# Patient Record
Sex: FEMALE | Race: WHITE | NOT HISPANIC OR LATINO | Employment: OTHER | URBAN - METROPOLITAN AREA
[De-identification: names, ages, dates, MRNs, and addresses within clinical notes are randomized per-mention and may not be internally consistent; named-entity substitution may affect disease eponyms.]

---

## 2017-01-03 ENCOUNTER — HOSPITAL ENCOUNTER (OUTPATIENT)
Dept: LAB | Facility: MEDICAL CENTER | Age: 77
End: 2017-01-03
Attending: INTERNAL MEDICINE
Payer: MEDICARE

## 2017-01-03 LAB
ALBUMIN SERPL BCP-MCNC: 3.9 G/DL (ref 3.2–4.9)
ALBUMIN/GLOB SERPL: 1.6 G/DL
ALP SERPL-CCNC: 96 U/L (ref 30–99)
ALT SERPL-CCNC: 15 U/L (ref 2–50)
ANION GAP SERPL CALC-SCNC: 9 MMOL/L (ref 0–11.9)
APTT PPP: 27 SEC (ref 24.7–36)
AST SERPL-CCNC: 26 U/L (ref 12–45)
BASOPHILS # BLD AUTO: 0.02 K/UL (ref 0–0.12)
BASOPHILS NFR BLD AUTO: 0.4 % (ref 0–1.8)
BILIRUB SERPL-MCNC: 0.5 MG/DL (ref 0.1–1.5)
BUN SERPL-MCNC: 19 MG/DL (ref 8–22)
CALCIUM SERPL-MCNC: 9 MG/DL (ref 8.5–10.5)
CANCER AG19-9 SERPL-ACNC: 40.9 U/ML (ref 0–35)
CHLORIDE SERPL-SCNC: 94 MMOL/L (ref 96–112)
CO2 SERPL-SCNC: 27 MMOL/L (ref 20–33)
CREAT SERPL-MCNC: 0.89 MG/DL (ref 0.5–1.4)
EOSINOPHIL # BLD: 0.06 K/UL (ref 0–0.51)
EOSINOPHIL NFR BLD AUTO: 1.2 % (ref 0–6.9)
ERYTHROCYTE [DISTWIDTH] IN BLOOD BY AUTOMATED COUNT: 64.3 FL (ref 35.9–50)
GLOBULIN SER CALC-MCNC: 2.5 G/DL (ref 1.9–3.5)
GLUCOSE SERPL-MCNC: 94 MG/DL (ref 65–99)
HCT VFR BLD AUTO: 40.1 % (ref 37–47)
HGB BLD-MCNC: 13.2 G/DL (ref 12–16)
IMM GRANULOCYTES # BLD AUTO: 0.01 K/UL (ref 0–0.11)
IMM GRANULOCYTES NFR BLD AUTO: 0.2 % (ref 0–0.9)
INR PPP: 0.87 (ref 0.87–1.13)
LYMPHOCYTES # BLD: 0.95 K/UL (ref 1–4.8)
LYMPHOCYTES NFR BLD AUTO: 18.7 % (ref 22–41)
MCH RBC QN AUTO: 28.1 PG (ref 27–33)
MCHC RBC AUTO-ENTMCNC: 32.9 G/DL (ref 33.6–35)
MCV RBC AUTO: 85.5 FL (ref 81.4–97.8)
MONOCYTES # BLD: 0.75 K/UL (ref 0–0.85)
MONOCYTES NFR BLD AUTO: 14.8 % (ref 0–13.4)
NEUTROPHILS # BLD: 3.29 K/UL (ref 2–7.15)
NEUTROPHILS NFR BLD AUTO: 64.7 % (ref 44–72)
NRBC # BLD AUTO: 0 K/UL
NRBC BLD-RTO: 0 /100 WBC
PLATELET # BLD AUTO: 210 K/UL (ref 164–446)
PMV BLD AUTO: 11 FL (ref 9–12.9)
POTASSIUM SERPL-SCNC: 4.7 MMOL/L (ref 3.6–5.5)
PROT SERPL-MCNC: 6.4 G/DL (ref 6–8.2)
PROTHROMBIN TIME: 12.1 SEC (ref 12–14.6)
RBC # BLD AUTO: 4.69 M/UL (ref 4.2–5.4)
SODIUM SERPL-SCNC: 130 MMOL/L (ref 135–145)
WBC # BLD AUTO: 5.1 K/UL (ref 4.8–10.8)

## 2017-01-03 PROCEDURE — 85025 COMPLETE CBC W/AUTO DIFF WBC: CPT

## 2017-01-03 PROCEDURE — 80053 COMPREHEN METABOLIC PANEL: CPT

## 2017-01-03 PROCEDURE — 36415 COLL VENOUS BLD VENIPUNCTURE: CPT

## 2017-01-03 PROCEDURE — 85730 THROMBOPLASTIN TIME PARTIAL: CPT | Mod: GA

## 2017-01-03 PROCEDURE — 86301 IMMUNOASSAY TUMOR CA 19-9: CPT | Mod: GA

## 2017-01-03 PROCEDURE — 85610 PROTHROMBIN TIME: CPT

## 2017-01-03 RX ORDER — METOPROLOL SUCCINATE 25 MG/1
TABLET, EXTENDED RELEASE ORAL
Qty: 90 TAB | Refills: 1 | Status: SHIPPED | OUTPATIENT
Start: 2017-01-03

## 2017-01-03 NOTE — TELEPHONE ENCOUNTER
Was the patient seen in the last year in this department? Yes     Does patient have an active prescription for medications requested? No     Received Request Via: Pharmacy

## 2017-01-04 ENCOUNTER — APPOINTMENT (OUTPATIENT)
Dept: ADMISSIONS | Facility: MEDICAL CENTER | Age: 77
End: 2017-01-04
Attending: INTERNAL MEDICINE
Payer: MEDICARE

## 2017-01-11 PROBLEM — R63.4 LOSS OF WEIGHT: Status: ACTIVE | Noted: 2017-01-11

## 2017-01-11 PROBLEM — R93.5 ABNORMAL ABDOMINAL CT SCAN: Status: ACTIVE | Noted: 2017-01-11

## 2017-01-11 PROBLEM — R93.2: Status: RESOLVED | Noted: 2017-01-11 | Resolved: 2017-01-11

## 2017-01-11 PROBLEM — D50.9 IRON DEFICIENCY ANEMIA, UNSPECIFIED: Status: ACTIVE | Noted: 2017-01-11

## 2017-01-11 PROBLEM — R93.2: Status: ACTIVE | Noted: 2017-01-11

## 2017-01-16 DIAGNOSIS — G47.00 INSOMNIA, UNSPECIFIED TYPE: ICD-10-CM

## 2017-01-16 RX ORDER — ZOLPIDEM TARTRATE 10 MG/1
10 TABLET ORAL NIGHTLY PRN
Qty: 90 TAB | Refills: 0 | Status: SHIPPED
Start: 2017-01-16

## 2017-01-25 DIAGNOSIS — E55.9 VITAMIN D DEFICIENCY: ICD-10-CM

## 2017-01-25 DIAGNOSIS — E78.5 HYPERLIPIDEMIA LDL GOAL <70: ICD-10-CM

## 2017-01-25 DIAGNOSIS — E03.9 ACQUIRED HYPOTHYROIDISM: ICD-10-CM

## 2017-01-25 DIAGNOSIS — I10 ESSENTIAL HYPERTENSION, BENIGN: ICD-10-CM

## 2017-01-25 DIAGNOSIS — D50.9 IRON DEFICIENCY ANEMIA, UNSPECIFIED: ICD-10-CM

## 2017-01-25 RX ORDER — LISINOPRIL 20 MG/1
TABLET ORAL
Qty: 90 TAB | Refills: 0 | Status: SHIPPED | OUTPATIENT
Start: 2017-01-25

## 2017-01-25 NOTE — Clinical Note
January 26, 2017        Shyla Ochoa  [address] [address]        Dear Shyla:    We have received a request from your pharmacy to refill your prescription(s). After careful review of your chart, we have noted you are due for labs and a follow up appointment in April 2017.  We request you call our office at [phone] at your earliest convenience and make an appointment. I have included a fasting lab order for you.    Prescribed medications provided needed treatments for our patients. However, when patients are not followed closely by their physician, potential medication complications may go unadressed. We look forward to scheduling an appointment for you, so that we may provide you with the safest and most complete medical care.        If you have any questions or concerns, please don't hesitate to call.        Sincerely,        CLAUDE Joel.    Electronically Signed

## 2017-04-18 ENCOUNTER — HOSPITAL ENCOUNTER (OUTPATIENT)
Dept: LAB | Facility: MEDICAL CENTER | Age: 77
DRG: 115 | End: 2017-04-18
Attending: NURSE PRACTITIONER
Payer: MEDICARE

## 2017-04-18 DIAGNOSIS — E03.9 ACQUIRED HYPOTHYROIDISM: ICD-10-CM

## 2017-04-18 DIAGNOSIS — E55.9 VITAMIN D DEFICIENCY: ICD-10-CM

## 2017-04-18 DIAGNOSIS — I10 ESSENTIAL HYPERTENSION, BENIGN: ICD-10-CM

## 2017-04-18 DIAGNOSIS — E78.5 HYPERLIPIDEMIA LDL GOAL <70: ICD-10-CM

## 2017-04-18 DIAGNOSIS — D50.9 IRON DEFICIENCY ANEMIA, UNSPECIFIED: ICD-10-CM

## 2017-04-18 LAB
25(OH)D3 SERPL-MCNC: 33 NG/ML (ref 30–100)
ALBUMIN SERPL BCP-MCNC: 4 G/DL (ref 3.2–4.9)
ALBUMIN/GLOB SERPL: 1.5 G/DL
ALP SERPL-CCNC: 98 U/L (ref 30–99)
ALT SERPL-CCNC: 16 U/L (ref 2–50)
ANION GAP SERPL CALC-SCNC: 14 MMOL/L (ref 0–11.9)
AST SERPL-CCNC: 29 U/L (ref 12–45)
BASOPHILS # BLD AUTO: 0.1 % (ref 0–1.8)
BASOPHILS # BLD: 0.01 K/UL (ref 0–0.12)
BILIRUB SERPL-MCNC: 1.2 MG/DL (ref 0.1–1.5)
BUN SERPL-MCNC: 18 MG/DL (ref 8–22)
CALCIUM SERPL-MCNC: 9.2 MG/DL (ref 8.5–10.5)
CHLORIDE SERPL-SCNC: 100 MMOL/L (ref 96–112)
CHOLEST SERPL-MCNC: 237 MG/DL (ref 100–199)
CO2 SERPL-SCNC: 22 MMOL/L (ref 20–33)
CREAT SERPL-MCNC: 0.93 MG/DL (ref 0.5–1.4)
EOSINOPHIL # BLD AUTO: 0.02 K/UL (ref 0–0.51)
EOSINOPHIL NFR BLD: 0.2 % (ref 0–6.9)
ERYTHROCYTE [DISTWIDTH] IN BLOOD BY AUTOMATED COUNT: 63.2 FL (ref 35.9–50)
GFR SERPL CREATININE-BSD FRML MDRD: 58 ML/MIN/1.73 M 2
GLOBULIN SER CALC-MCNC: 2.7 G/DL (ref 1.9–3.5)
GLUCOSE SERPL-MCNC: 119 MG/DL (ref 65–99)
HCT VFR BLD AUTO: 38.9 % (ref 37–47)
HDLC SERPL-MCNC: 160 MG/DL
HGB BLD-MCNC: 13.3 G/DL (ref 12–16)
IMM GRANULOCYTES # BLD AUTO: 0.03 K/UL (ref 0–0.11)
IMM GRANULOCYTES NFR BLD AUTO: 0.3 % (ref 0–0.9)
LDLC SERPL CALC-MCNC: 49 MG/DL
LYMPHOCYTES # BLD AUTO: 0.83 K/UL (ref 1–4.8)
LYMPHOCYTES NFR BLD: 9.1 % (ref 22–41)
MCH RBC QN AUTO: 33.8 PG (ref 27–33)
MCHC RBC AUTO-ENTMCNC: 34.2 G/DL (ref 33.6–35)
MCV RBC AUTO: 99 FL (ref 81.4–97.8)
MONOCYTES # BLD AUTO: 1.09 K/UL (ref 0–0.85)
MONOCYTES NFR BLD AUTO: 12 % (ref 0–13.4)
NEUTROPHILS # BLD AUTO: 7.14 K/UL (ref 2–7.15)
NEUTROPHILS NFR BLD: 78.3 % (ref 44–72)
NRBC # BLD AUTO: 0.02 K/UL
NRBC BLD AUTO-RTO: 0.2 /100 WBC
PLATELET # BLD AUTO: 178 K/UL (ref 164–446)
PMV BLD AUTO: 11 FL (ref 9–12.9)
POTASSIUM SERPL-SCNC: 3.5 MMOL/L (ref 3.6–5.5)
PROT SERPL-MCNC: 6.7 G/DL (ref 6–8.2)
RBC # BLD AUTO: 3.93 M/UL (ref 4.2–5.4)
SODIUM SERPL-SCNC: 136 MMOL/L (ref 135–145)
T4 FREE SERPL-MCNC: 0.88 NG/DL (ref 0.53–1.43)
TRIGL SERPL-MCNC: 138 MG/DL (ref 0–149)
TSH SERPL DL<=0.005 MIU/L-ACNC: 2.49 UIU/ML (ref 0.3–3.7)
WBC # BLD AUTO: 9.1 K/UL (ref 4.8–10.8)

## 2017-04-20 ENCOUNTER — HOSPITAL ENCOUNTER (INPATIENT)
Facility: MEDICAL CENTER | Age: 77
LOS: 4 days | DRG: 115 | End: 2017-04-24
Attending: EMERGENCY MEDICINE | Admitting: SURGERY
Payer: MEDICARE

## 2017-04-20 ENCOUNTER — APPOINTMENT (OUTPATIENT)
Dept: RADIOLOGY | Facility: MEDICAL CENTER | Age: 77
DRG: 115 | End: 2017-04-20
Attending: EMERGENCY MEDICINE
Payer: MEDICARE

## 2017-04-20 DIAGNOSIS — Z79.01 INADEQUATE ANTICOAGULATION: ICD-10-CM

## 2017-04-20 DIAGNOSIS — S02.92XA: ICD-10-CM

## 2017-04-20 DIAGNOSIS — Z51.81 INADEQUATE ANTICOAGULATION: ICD-10-CM

## 2017-04-20 DIAGNOSIS — S01.81XA FOREHEAD LACERATION, INITIAL ENCOUNTER: ICD-10-CM

## 2017-04-20 DIAGNOSIS — S05.31XA: ICD-10-CM

## 2017-04-20 DIAGNOSIS — S02.30XA CLOSED FRACTURE OF ORBITAL FLOOR, INITIAL ENCOUNTER (HCC): ICD-10-CM

## 2017-04-20 DIAGNOSIS — S05.31XA RUPTURED GLOBE OF RIGHT EYE, INITIAL ENCOUNTER: ICD-10-CM

## 2017-04-20 DIAGNOSIS — F10.929 ALCOHOL INTOXICATION, WITH UNSPECIFIED COMPLICATION (HCC): ICD-10-CM

## 2017-04-20 DIAGNOSIS — T14.90XA TRAUMA: ICD-10-CM

## 2017-04-20 LAB
ALBUMIN SERPL BCP-MCNC: 4.2 G/DL (ref 3.2–4.9)
ALBUMIN/GLOB SERPL: 1.4 G/DL
ALP SERPL-CCNC: 99 U/L (ref 30–99)
ALT SERPL-CCNC: 18 U/L (ref 2–50)
ANION GAP SERPL CALC-SCNC: 11 MMOL/L (ref 0–11.9)
APTT PPP: 22.8 SEC (ref 24.7–36)
AST SERPL-CCNC: 35 U/L (ref 12–45)
BILIRUB SERPL-MCNC: 0.4 MG/DL (ref 0.1–1.5)
BUN SERPL-MCNC: 19 MG/DL (ref 8–22)
CALCIUM SERPL-MCNC: 8.9 MG/DL (ref 8.5–10.5)
CFT BLD TEG: 4.4 MIN (ref 5–10)
CHLORIDE SERPL-SCNC: 106 MMOL/L (ref 96–112)
CLOT ANGLE BLD TEG: 68.9 DEGREES (ref 53–72)
CLOT LYSIS 30M P MA LENFR BLD TEG: 1.6 % (ref 0–8)
CO2 SERPL-SCNC: 26 MMOL/L (ref 20–33)
CREAT SERPL-MCNC: 0.86 MG/DL (ref 0.5–1.4)
CT.EXTRINSIC BLD ROTEM: 1.6 MIN (ref 1–3)
ERYTHROCYTE [DISTWIDTH] IN BLOOD BY AUTOMATED COUNT: 68.5 FL (ref 35.9–50)
ETHANOL BLD-MCNC: 0.34 G/DL
GFR SERPL CREATININE-BSD FRML MDRD: >60 ML/MIN/1.73 M 2
GLOBULIN SER CALC-MCNC: 2.9 G/DL (ref 1.9–3.5)
GLUCOSE SERPL-MCNC: 117 MG/DL (ref 65–99)
HCG SERPL QL: NEGATIVE
HCT VFR BLD AUTO: 41.8 % (ref 37–47)
HGB BLD-MCNC: 13.5 G/DL (ref 12–16)
INR PPP: 0.94 (ref 0.87–1.13)
MCF BLD TEG: 54.8 MM (ref 50–70)
MCH RBC QN AUTO: 32.3 PG (ref 27–33)
MCHC RBC AUTO-ENTMCNC: 32.3 G/DL (ref 33.6–35)
MCV RBC AUTO: 100 FL (ref 81.4–97.8)
PLATELET # BLD AUTO: 187 K/UL (ref 164–446)
PMV BLD AUTO: 10.1 FL (ref 9–12.9)
POTASSIUM SERPL-SCNC: 3.7 MMOL/L (ref 3.6–5.5)
PROT SERPL-MCNC: 7.1 G/DL (ref 6–8.2)
PROTHROMBIN TIME: 12.9 SEC (ref 12–14.6)
RBC # BLD AUTO: 4.18 M/UL (ref 4.2–5.4)
SODIUM SERPL-SCNC: 143 MMOL/L (ref 135–145)
TEG ALGORITHM TGALG: ABNORMAL
WBC # BLD AUTO: 7.3 K/UL (ref 4.8–10.8)

## 2017-04-20 PROCEDURE — 70450 CT HEAD/BRAIN W/O DYE: CPT

## 2017-04-20 PROCEDURE — 770006 HCHG ROOM/CARE - MED/SURG/GYN SEMI*

## 2017-04-20 PROCEDURE — 160009 HCHG ANES TIME/MIN: Performed by: OPHTHALMOLOGY

## 2017-04-20 PROCEDURE — 96375 TX/PRO/DX INJ NEW DRUG ADDON: CPT

## 2017-04-20 PROCEDURE — 304562 HCHG STAT O2 MASK/CANNULA

## 2017-04-20 PROCEDURE — 96365 THER/PROPH/DIAG IV INF INIT: CPT

## 2017-04-20 PROCEDURE — 85576 BLOOD PLATELET AGGREGATION: CPT

## 2017-04-20 PROCEDURE — 700111 HCHG RX REV CODE 636 W/ 250 OVERRIDE (IP): Performed by: OPHTHALMOLOGY

## 2017-04-20 PROCEDURE — 08QSXZZ REPAIR RIGHT CONJUNCTIVA, EXTERNAL APPROACH: ICD-10-PCS | Performed by: OPHTHALMOLOGY

## 2017-04-20 PROCEDURE — 700102 HCHG RX REV CODE 250 W/ 637 OVERRIDE(OP): Performed by: NURSE PRACTITIONER

## 2017-04-20 PROCEDURE — A6402 STERILE GAUZE <= 16 SQ IN: HCPCS | Performed by: OPHTHALMOLOGY

## 2017-04-20 PROCEDURE — 96367 TX/PROPH/DG ADDL SEQ IV INF: CPT

## 2017-04-20 PROCEDURE — 700111 HCHG RX REV CODE 636 W/ 250 OVERRIDE (IP)

## 2017-04-20 PROCEDURE — 72125 CT NECK SPINE W/O DYE: CPT

## 2017-04-20 PROCEDURE — 160048 HCHG OR STATISTICAL LEVEL 1-5: Performed by: OPHTHALMOLOGY

## 2017-04-20 PROCEDURE — 160041 HCHG SURGERY MINUTES - EA ADDL 1 MIN LEVEL 4: Performed by: OPHTHALMOLOGY

## 2017-04-20 PROCEDURE — 90471 IMMUNIZATION ADMIN: CPT

## 2017-04-20 PROCEDURE — 85027 COMPLETE CBC AUTOMATED: CPT

## 2017-04-20 PROCEDURE — 85730 THROMBOPLASTIN TIME PARTIAL: CPT

## 2017-04-20 PROCEDURE — 700101 HCHG RX REV CODE 250: Performed by: EMERGENCY MEDICINE

## 2017-04-20 PROCEDURE — 110382 HCHG SHELL REV 271: Performed by: OPHTHALMOLOGY

## 2017-04-20 PROCEDURE — 160029 HCHG SURGERY MINUTES - 1ST 30 MINS LEVEL 4: Performed by: OPHTHALMOLOGY

## 2017-04-20 PROCEDURE — 70486 CT MAXILLOFACIAL W/O DYE: CPT

## 2017-04-20 PROCEDURE — 160036 HCHG PACU - EA ADDL 30 MINS PHASE I: Performed by: OPHTHALMOLOGY

## 2017-04-20 PROCEDURE — 700111 HCHG RX REV CODE 636 W/ 250 OVERRIDE (IP): Performed by: EMERGENCY MEDICINE

## 2017-04-20 PROCEDURE — 700101 HCHG RX REV CODE 250

## 2017-04-20 PROCEDURE — 700101 HCHG RX REV CODE 250: Performed by: OPHTHALMOLOGY

## 2017-04-20 PROCEDURE — 502240 HCHG MISC OR SUPPLY RC 0272: Performed by: OPHTHALMOLOGY

## 2017-04-20 PROCEDURE — 0HQ1XZZ REPAIR FACE SKIN, EXTERNAL APPROACH: ICD-10-PCS | Performed by: EMERGENCY MEDICINE

## 2017-04-20 PROCEDURE — 08Q6XZZ REPAIR RIGHT SCLERA, EXTERNAL APPROACH: ICD-10-PCS | Performed by: OPHTHALMOLOGY

## 2017-04-20 PROCEDURE — 160002 HCHG RECOVERY MINUTES (STAT): Performed by: OPHTHALMOLOGY

## 2017-04-20 PROCEDURE — 700102 HCHG RX REV CODE 250 W/ 637 OVERRIDE(OP): Performed by: SURGERY

## 2017-04-20 PROCEDURE — 160035 HCHG PACU - 1ST 60 MINS PHASE I: Performed by: OPHTHALMOLOGY

## 2017-04-20 PROCEDURE — 303747 HCHG EXTRA SUTURE

## 2017-04-20 PROCEDURE — A9270 NON-COVERED ITEM OR SERVICE: HCPCS | Performed by: SURGERY

## 2017-04-20 PROCEDURE — 304999 HCHG REPAIR-SIMPLE/INTERMED LEVEL 1

## 2017-04-20 PROCEDURE — 502572 HCHG PACK, GENERAL EYE: Performed by: OPHTHALMOLOGY

## 2017-04-20 PROCEDURE — 500122 HCHG BOVIE, BLADE: Performed by: OPHTHALMOLOGY

## 2017-04-20 PROCEDURE — 90715 TDAP VACCINE 7 YRS/> IM: CPT | Performed by: EMERGENCY MEDICINE

## 2017-04-20 PROCEDURE — 99285 EMERGENCY DEPT VISIT HI MDM: CPT

## 2017-04-20 PROCEDURE — 85347 COAGULATION TIME ACTIVATED: CPT

## 2017-04-20 PROCEDURE — 700111 HCHG RX REV CODE 636 W/ 250 OVERRIDE (IP): Performed by: NURSE PRACTITIONER

## 2017-04-20 PROCEDURE — 80307 DRUG TEST PRSMV CHEM ANLYZR: CPT

## 2017-04-20 PROCEDURE — 85610 PROTHROMBIN TIME: CPT

## 2017-04-20 PROCEDURE — 501836 HCHG SUTURE EYE: Performed by: OPHTHALMOLOGY

## 2017-04-20 PROCEDURE — 304217 HCHG IRRIGATION SYSTEM

## 2017-04-20 PROCEDURE — 71010 DX-CHEST-PORTABLE (1 VIEW): CPT

## 2017-04-20 PROCEDURE — HZ2ZZZZ DETOXIFICATION SERVICES FOR SUBSTANCE ABUSE TREATMENT: ICD-10-PCS | Performed by: SURGERY

## 2017-04-20 PROCEDURE — 110371 HCHG SHELL REV 272: Performed by: OPHTHALMOLOGY

## 2017-04-20 PROCEDURE — A9270 NON-COVERED ITEM OR SERVICE: HCPCS | Performed by: NURSE PRACTITIONER

## 2017-04-20 PROCEDURE — A6410 STERILE EYE PAD: HCPCS | Performed by: OPHTHALMOLOGY

## 2017-04-20 PROCEDURE — A4606 OXYGEN PROBE USED W OXIMETER: HCPCS | Performed by: OPHTHALMOLOGY

## 2017-04-20 PROCEDURE — 84703 CHORIONIC GONADOTROPIN ASSAY: CPT

## 2017-04-20 PROCEDURE — 85384 FIBRINOGEN ACTIVITY: CPT

## 2017-04-20 PROCEDURE — 3E0234Z INTRODUCTION OF SERUM, TOXOID AND VACCINE INTO MUSCLE, PERCUTANEOUS APPROACH: ICD-10-PCS | Performed by: EMERGENCY MEDICINE

## 2017-04-20 PROCEDURE — 500558 HCHG EYE SHIELD W/GARTER (FOX): Performed by: OPHTHALMOLOGY

## 2017-04-20 PROCEDURE — 96376 TX/PRO/DX INJ SAME DRUG ADON: CPT

## 2017-04-20 PROCEDURE — 80053 COMPREHEN METABOLIC PANEL: CPT

## 2017-04-20 PROCEDURE — 700111 HCHG RX REV CODE 636 W/ 250 OVERRIDE (IP): Performed by: SURGERY

## 2017-04-20 RX ORDER — METOPROLOL SUCCINATE 50 MG/1
50 TABLET, EXTENDED RELEASE ORAL DAILY
Status: ON HOLD | COMMUNITY
End: 2017-04-24

## 2017-04-20 RX ORDER — MOXIFLOXACIN 5 MG/ML
SOLUTION/ DROPS OPHTHALMIC
Status: DISCONTINUED | OUTPATIENT
Start: 2017-04-20 | End: 2017-04-20 | Stop reason: HOSPADM

## 2017-04-20 RX ORDER — MORPHINE SULFATE 4 MG/ML
1-4 INJECTION, SOLUTION INTRAMUSCULAR; INTRAVENOUS
Status: DISCONTINUED | OUTPATIENT
Start: 2017-04-20 | End: 2017-04-24 | Stop reason: HOSPADM

## 2017-04-20 RX ORDER — BISACODYL 10 MG
10 SUPPOSITORY, RECTAL RECTAL
Status: DISCONTINUED | OUTPATIENT
Start: 2017-04-20 | End: 2017-04-24 | Stop reason: HOSPADM

## 2017-04-20 RX ORDER — MORPHINE SULFATE 4 MG/ML
1-5 INJECTION, SOLUTION INTRAMUSCULAR; INTRAVENOUS
Status: DISCONTINUED | OUTPATIENT
Start: 2017-04-20 | End: 2017-04-20

## 2017-04-20 RX ORDER — OXYCODONE HYDROCHLORIDE 10 MG/1
10 TABLET ORAL
Status: DISCONTINUED | OUTPATIENT
Start: 2017-04-20 | End: 2017-04-24 | Stop reason: HOSPADM

## 2017-04-20 RX ORDER — LEVOTHYROXINE SODIUM 0.05 MG/1
50 TABLET ORAL
Status: DISCONTINUED | OUTPATIENT
Start: 2017-04-21 | End: 2017-04-24 | Stop reason: HOSPADM

## 2017-04-20 RX ORDER — CEFAZOLIN SODIUM 1 G/3ML
1 INJECTION, POWDER, FOR SOLUTION INTRAMUSCULAR; INTRAVENOUS ONCE
Status: COMPLETED | OUTPATIENT
Start: 2017-04-20 | End: 2017-04-20

## 2017-04-20 RX ORDER — GABAPENTIN 300 MG/1
300 CAPSULE ORAL 3 TIMES DAILY
Status: ON HOLD | COMMUNITY
End: 2017-04-24

## 2017-04-20 RX ORDER — MOXIFLOXACIN HYDROCHLORIDE 400 MG/250ML
400 INJECTION, SOLUTION INTRAVENOUS ONCE
Status: DISCONTINUED | OUTPATIENT
Start: 2017-04-20 | End: 2017-04-20

## 2017-04-20 RX ORDER — LORAZEPAM 1 MG/1
0.5 TABLET ORAL EVERY 4 HOURS PRN
Status: DISCONTINUED | OUTPATIENT
Start: 2017-04-20 | End: 2017-04-24 | Stop reason: HOSPADM

## 2017-04-20 RX ORDER — LORAZEPAM 1 MG/1
1 TABLET ORAL EVERY 4 HOURS PRN
Status: DISCONTINUED | OUTPATIENT
Start: 2017-04-20 | End: 2017-04-24 | Stop reason: HOSPADM

## 2017-04-20 RX ORDER — DEXAMETHASONE SODIUM PHOSPHATE 4 MG/ML
INJECTION, SOLUTION INTRA-ARTICULAR; INTRALESIONAL; INTRAMUSCULAR; INTRAVENOUS; SOFT TISSUE
Status: DISCONTINUED | OUTPATIENT
Start: 2017-04-20 | End: 2017-04-20 | Stop reason: HOSPADM

## 2017-04-20 RX ORDER — LEVOTHYROXINE SODIUM 0.05 MG/1
50 TABLET ORAL
Status: ON HOLD | COMMUNITY
End: 2017-04-24

## 2017-04-20 RX ORDER — CEFAZOLIN SODIUM 1 G/3ML
INJECTION, POWDER, FOR SOLUTION INTRAMUSCULAR; INTRAVENOUS
Status: DISCONTINUED | OUTPATIENT
Start: 2017-04-20 | End: 2017-04-20 | Stop reason: HOSPADM

## 2017-04-20 RX ORDER — ENEMA 19; 7 G/133ML; G/133ML
1 ENEMA RECTAL
Status: DISCONTINUED | OUTPATIENT
Start: 2017-04-20 | End: 2017-04-24 | Stop reason: HOSPADM

## 2017-04-20 RX ORDER — LEVOFLOXACIN 5 MG/ML
750 INJECTION, SOLUTION INTRAVENOUS EVERY 24 HOURS
Status: DISCONTINUED | OUTPATIENT
Start: 2017-04-20 | End: 2017-04-21

## 2017-04-20 RX ORDER — SODIUM CHLORIDE, SODIUM LACTATE, POTASSIUM CHLORIDE, CALCIUM CHLORIDE 600; 310; 30; 20 MG/100ML; MG/100ML; MG/100ML; MG/100ML
INJECTION, SOLUTION INTRAVENOUS CONTINUOUS
Status: DISCONTINUED | OUTPATIENT
Start: 2017-04-20 | End: 2017-04-23

## 2017-04-20 RX ORDER — BALANCED SALT SOLUTION 6.4; .75; .48; .3; 3.9; 1.7 MG/ML; MG/ML; MG/ML; MG/ML; MG/ML; MG/ML
SOLUTION OPHTHALMIC
Status: DISCONTINUED | OUTPATIENT
Start: 2017-04-20 | End: 2017-04-20 | Stop reason: HOSPADM

## 2017-04-20 RX ORDER — OXYCODONE HYDROCHLORIDE 5 MG/1
5 TABLET ORAL
Status: DISCONTINUED | OUTPATIENT
Start: 2017-04-20 | End: 2017-04-24 | Stop reason: HOSPADM

## 2017-04-20 RX ORDER — NEOMYCIN SULFATE, POLYMYXIN B SULFATE AND BACITRACIN ZINC 3.5; 10000; 4 MG/G; [USP'U]/G; [USP'U]/G
OINTMENT OPHTHALMIC
Status: DISCONTINUED | OUTPATIENT
Start: 2017-04-20 | End: 2017-04-20 | Stop reason: HOSPADM

## 2017-04-20 RX ORDER — ONDANSETRON 2 MG/ML
INJECTION INTRAMUSCULAR; INTRAVENOUS
Status: COMPLETED
Start: 2017-04-20 | End: 2017-04-20

## 2017-04-20 RX ORDER — DOCUSATE SODIUM 100 MG/1
100 CAPSULE, LIQUID FILLED ORAL 2 TIMES DAILY
Status: DISCONTINUED | OUTPATIENT
Start: 2017-04-20 | End: 2017-04-24 | Stop reason: HOSPADM

## 2017-04-20 RX ORDER — LORAZEPAM 2 MG/ML
0.5 INJECTION INTRAMUSCULAR EVERY 4 HOURS PRN
Status: DISCONTINUED | OUTPATIENT
Start: 2017-04-20 | End: 2017-04-24 | Stop reason: HOSPADM

## 2017-04-20 RX ORDER — CHLORHEXIDINE GLUCONATE ORAL RINSE 1.2 MG/ML
15 SOLUTION DENTAL EVERY 12 HOURS
Status: DISCONTINUED | OUTPATIENT
Start: 2017-04-20 | End: 2017-04-20

## 2017-04-20 RX ORDER — LISINOPRIL 20 MG/1
20 TABLET ORAL DAILY
Status: DISCONTINUED | OUTPATIENT
Start: 2017-04-21 | End: 2017-04-24 | Stop reason: HOSPADM

## 2017-04-20 RX ORDER — AMOXICILLIN 250 MG
1 CAPSULE ORAL
Status: DISCONTINUED | OUTPATIENT
Start: 2017-04-20 | End: 2017-04-24 | Stop reason: HOSPADM

## 2017-04-20 RX ORDER — METOPROLOL SUCCINATE 50 MG/1
50 TABLET, EXTENDED RELEASE ORAL DAILY
Status: DISCONTINUED | OUTPATIENT
Start: 2017-04-20 | End: 2017-04-24 | Stop reason: HOSPADM

## 2017-04-20 RX ORDER — TROPICAMIDE 5 MG/ML
1 SOLUTION/ DROPS OPHTHALMIC
Status: DISCONTINUED | OUTPATIENT
Start: 2017-04-20 | End: 2017-04-24 | Stop reason: HOSPADM

## 2017-04-20 RX ORDER — LISINOPRIL 20 MG/1
20 TABLET ORAL DAILY
Status: ON HOLD | COMMUNITY
End: 2017-04-24

## 2017-04-20 RX ORDER — ZOLPIDEM TARTRATE 10 MG/1
10 TABLET ORAL NIGHTLY PRN
COMMUNITY
End: 2017-04-20

## 2017-04-20 RX ORDER — GABAPENTIN 300 MG/1
300 CAPSULE ORAL 3 TIMES DAILY
Status: DISCONTINUED | OUTPATIENT
Start: 2017-04-20 | End: 2017-04-24 | Stop reason: HOSPADM

## 2017-04-20 RX ORDER — ONDANSETRON 2 MG/ML
4 INJECTION INTRAMUSCULAR; INTRAVENOUS EVERY 4 HOURS PRN
Status: DISCONTINUED | OUTPATIENT
Start: 2017-04-20 | End: 2017-04-24 | Stop reason: HOSPADM

## 2017-04-20 RX ORDER — MORPHINE SULFATE 10 MG/ML
5 INJECTION, SOLUTION INTRAMUSCULAR; INTRAVENOUS
Status: DISCONTINUED | OUTPATIENT
Start: 2017-04-20 | End: 2017-04-24 | Stop reason: HOSPADM

## 2017-04-20 RX ORDER — LORAZEPAM 2 MG/ML
1 INJECTION INTRAMUSCULAR
Status: DISCONTINUED | OUTPATIENT
Start: 2017-04-20 | End: 2017-04-24 | Stop reason: HOSPADM

## 2017-04-20 RX ORDER — LORAZEPAM 1 MG/1
2 TABLET ORAL
Status: DISCONTINUED | OUTPATIENT
Start: 2017-04-20 | End: 2017-04-24 | Stop reason: HOSPADM

## 2017-04-20 RX ORDER — POLYETHYLENE GLYCOL 3350 17 G/17G
1 POWDER, FOR SOLUTION ORAL 2 TIMES DAILY
Status: DISCONTINUED | OUTPATIENT
Start: 2017-04-20 | End: 2017-04-24 | Stop reason: HOSPADM

## 2017-04-20 RX ORDER — ERYTHROMYCIN 5 MG/G
OINTMENT OPHTHALMIC ONCE
Status: COMPLETED | OUTPATIENT
Start: 2017-04-20 | End: 2017-04-20

## 2017-04-20 RX ORDER — LORAZEPAM 2 MG/ML
1.5 INJECTION INTRAMUSCULAR
Status: DISCONTINUED | OUTPATIENT
Start: 2017-04-20 | End: 2017-04-24 | Stop reason: HOSPADM

## 2017-04-20 RX ORDER — TETRACAINE HYDROCHLORIDE 5 MG/ML
1 SOLUTION OPHTHALMIC ONCE
Status: COMPLETED | OUTPATIENT
Start: 2017-04-20 | End: 2017-04-20

## 2017-04-20 RX ORDER — AMOXICILLIN 250 MG
1 CAPSULE ORAL NIGHTLY
Status: DISCONTINUED | OUTPATIENT
Start: 2017-04-20 | End: 2017-04-24 | Stop reason: HOSPADM

## 2017-04-20 RX ORDER — LORAZEPAM 1 MG/1
3 TABLET ORAL
Status: DISCONTINUED | OUTPATIENT
Start: 2017-04-20 | End: 2017-04-24 | Stop reason: HOSPADM

## 2017-04-20 RX ORDER — LORAZEPAM 1 MG/1
4 TABLET ORAL
Status: DISCONTINUED | OUTPATIENT
Start: 2017-04-20 | End: 2017-04-24 | Stop reason: HOSPADM

## 2017-04-20 RX ORDER — LORAZEPAM 2 MG/ML
2 INJECTION INTRAMUSCULAR
Status: DISCONTINUED | OUTPATIENT
Start: 2017-04-20 | End: 2017-04-24 | Stop reason: HOSPADM

## 2017-04-20 RX ORDER — LIDOCAINE HYDROCHLORIDE AND EPINEPHRINE BITARTRATE 20; .01 MG/ML; MG/ML
10 INJECTION, SOLUTION SUBCUTANEOUS ONCE
Status: COMPLETED | OUTPATIENT
Start: 2017-04-20 | End: 2017-04-20

## 2017-04-20 RX ORDER — OMEPRAZOLE 20 MG/1
20 CAPSULE, DELAYED RELEASE ORAL DAILY
COMMUNITY

## 2017-04-20 RX ADMIN — CEFAZOLIN SODIUM 1 G: 1 INJECTION, SOLUTION INTRAVENOUS at 23:35

## 2017-04-20 RX ADMIN — LEVOFLOXACIN 750 MG: 750 INJECTION, SOLUTION INTRAVENOUS at 12:19

## 2017-04-20 RX ADMIN — GABAPENTIN 300 MG: 300 CAPSULE ORAL at 14:16

## 2017-04-20 RX ADMIN — CEFAZOLIN 1 G: 1 INJECTION, POWDER, FOR SOLUTION INTRAVENOUS at 10:22

## 2017-04-20 RX ADMIN — LIDOCAINE HYDROCHLORIDE AND EPINEPHRINE 10 ML: 20; 10 INJECTION, SOLUTION INFILTRATION; PERINEURAL at 10:44

## 2017-04-20 RX ADMIN — ERYTHROMYCIN: 5 OINTMENT OPHTHALMIC at 08:55

## 2017-04-20 RX ADMIN — ONDANSETRON 4 MG: 2 INJECTION, SOLUTION INTRAMUSCULAR; INTRAVENOUS at 15:47

## 2017-04-20 RX ADMIN — METOPROLOL SUCCINATE 50 MG: 50 TABLET, EXTENDED RELEASE ORAL at 14:16

## 2017-04-20 RX ADMIN — HYDROMORPHONE HYDROCHLORIDE 0.5 MG: 1 INJECTION, SOLUTION INTRAMUSCULAR; INTRAVENOUS; SUBCUTANEOUS at 07:42

## 2017-04-20 RX ADMIN — SODIUM CHLORIDE, POTASSIUM CHLORIDE, SODIUM LACTATE AND CALCIUM CHLORIDE: 600; 310; 30; 20 INJECTION, SOLUTION INTRAVENOUS at 14:30

## 2017-04-20 RX ADMIN — GABAPENTIN 300 MG: 300 CAPSULE ORAL at 23:35

## 2017-04-20 RX ADMIN — LORAZEPAM 0.5 MG: 1 TABLET ORAL at 23:36

## 2017-04-20 RX ADMIN — MORPHINE SULFATE 2 MG: 4 INJECTION INTRAVENOUS at 12:16

## 2017-04-20 RX ADMIN — MORPHINE SULFATE 4 MG: 4 INJECTION INTRAVENOUS at 10:09

## 2017-04-20 RX ADMIN — CLOSTRIDIUM TETANI TOXOID ANTIGEN (FORMALDEHYDE INACTIVATED), CORYNEBACTERIUM DIPHTHERIAE TOXOID ANTIGEN (FORMALDEHYDE INACTIVATED), BORDETELLA PERTUSSIS TOXOID ANTIGEN (GLUTARALDEHYDE INACTIVATED), BORDETELLA PERTUSSIS FILAMENTOUS HEMAGGLUTININ ANTIGEN (FORMALDEHYDE INACTIVATED), BORDETELLA PERTUSSIS PERTACTIN ANTIGEN, AND BORDETELLA PERTUSSIS FIMBRIAE 2/3 ANTIGEN 0.5 ML: 5; 2; 2.5; 5; 3; 5 INJECTION, SUSPENSION INTRAMUSCULAR at 08:30

## 2017-04-20 RX ADMIN — CEFAZOLIN SODIUM 1 G: 1 INJECTION, SOLUTION INTRAVENOUS at 16:50

## 2017-04-20 RX ADMIN — MORPHINE SULFATE 4 MG: 4 INJECTION INTRAVENOUS at 14:19

## 2017-04-20 RX ADMIN — ONDANSETRON 4 MG: 2 INJECTION, SOLUTION INTRAMUSCULAR; INTRAVENOUS at 07:42

## 2017-04-20 ASSESSMENT — LIFESTYLE VARIABLES
NAUSEA AND VOMITING: MILD NAUSEA WITH NO VOMITING
EVER HAD A DRINK FIRST THING IN THE MORNING TO STEADY YOUR NERVES TO GET RID OF A HANGOVER: NO
CONSUMPTION TOTAL: POSITIVE
HAVE PEOPLE ANNOYED YOU BY CRITICIZING YOUR DRINKING: NO
ORIENTATION AND CLOUDING OF SENSORIUM: ORIENTED AND CAN DO SERIAL ADDITIONS
AGITATION: NORMAL ACTIVITY
EVER_SMOKED: NEVER
PAROXYSMAL SWEATS: NO SWEAT VISIBLE
ALCOHOL_USE: YES
AUDITORY DISTURBANCES: NOT PRESENT
EVER FELT BAD OR GUILTY ABOUT YOUR DRINKING: NO
TOTAL SCORE: 0
ON A TYPICAL DAY WHEN YOU DRINK ALCOHOL HOW MANY DRINKS DO YOU HAVE: 2
TREMOR: TREMOR NOT VISIBLE BUT CAN BE FELT, FINGERTIP TO FINGERTIP
VISUAL DISTURBANCES: NOT PRESENT
HOW MANY TIMES IN THE PAST YEAR HAVE YOU HAD 5 OR MORE DRINKS IN A DAY: 0
TOTAL SCORE: 0
AVERAGE NUMBER OF DAYS PER WEEK YOU HAVE A DRINK CONTAINING ALCOHOL: 7
ANXIETY: *
HEADACHE, FULLNESS IN HEAD: MILD
TOTAL SCORE: 0
HAVE YOU EVER FELT YOU SHOULD CUT DOWN ON YOUR DRINKING: NO
TOTAL SCORE: 6

## 2017-04-20 ASSESSMENT — PAIN SCALES - GENERAL
PAINLEVEL_OUTOF10: 8
PAINLEVEL_OUTOF10: 0
PAINLEVEL_OUTOF10: 1
PAINLEVEL_OUTOF10: 0
PAINLEVEL_OUTOF10: 0
PAINLEVEL_OUTOF10: 7
PAINLEVEL_OUTOF10: 4
PAINLEVEL_OUTOF10: 0

## 2017-04-20 ASSESSMENT — ENCOUNTER SYMPTOMS
EYE PAIN: 1
ABDOMINAL PAIN: 0
MYALGIAS: 1
HEADACHES: 0
CHILLS: 0
NAUSEA: 0
VOMITING: 0
FEVER: 0
FOCAL WEAKNESS: 0
SHORTNESS OF BREATH: 0

## 2017-04-20 ASSESSMENT — COPD QUESTIONNAIRES
DURING THE PAST 4 WEEKS HOW MUCH DID YOU FEEL SHORT OF BREATH: NONE/LITTLE OF THE TIME
HAVE YOU SMOKED AT LEAST 100 CIGARETTES IN YOUR ENTIRE LIFE: NO/DON'T KNOW
COPD SCREENING SCORE: 2
DO YOU EVER COUGH UP ANY MUCUS OR PHLEGM?: NO/ONLY WITH OCCASIONAL COLDS OR INFECTIONS

## 2017-04-20 NOTE — ED NOTES
Pt BIB [facility], pt presents with traumatic injury to rt eye , per [facility] - LOC, pt presents alert/oriented , after rt eye exposure , saline soaked guaze placed to rt eye

## 2017-04-20 NOTE — PROGRESS NOTES
"  Trauma/Surgical Progress Note    Author: Marcos Parsons Date & Time created: 4/20/2017   2:57 PM     Interval Events:    Admitted, fall.   Tertiary exam completed   OR tonight for right eye injury  Counseled     Review of Systems   Constitutional: Negative for fever and chills.   HENT:        Facial fracture   Eyes: Positive for pain.        Baseline vision left eye  Right eye pain    Respiratory: Negative for shortness of breath.    Cardiovascular: Negative for chest pain.   Gastrointestinal: Negative for nausea, vomiting and abdominal pain.   Genitourinary: Negative for dysuria.   Musculoskeletal: Positive for myalgias.   Skin:        Bilateral knee bruising. Per patient from previous fall.   Neurological: Negative for focal weakness and headaches.     Hemodynamics:  Blood pressure 111/68, pulse 61, temperature 36.2 °C (97.2 °F), resp. rate 11, height 1.753 m (5' 9.02\"), weight 70.308 kg (155 lb), SpO2 96 %, not currently breastfeeding.     Respiratory:    Respiration: (!) 11, Pulse Oximetry: 96 %, O2 Daily Delivery Respiratory : Silicone Nasal Cannula        RUL Breath Sounds: Clear, RML Breath Sounds: Clear, RLL Breath Sounds: Diminished, STEVEN Breath Sounds: Clear, LLL Breath Sounds: Diminished  Fluids:    Intake/Output Summary (Last 24 hours) at 04/20/17 1457  Last data filed at 04/20/17 0741   Gross per 24 hour   Intake      0 ml   Output      0 ml   Net      0 ml     Admit Weight: 70.308 kg (155 lb) (estimated)  Current Weight: 70.308 kg (155 lb) (estimated)    Physical Exam   Constitutional: She is oriented to person, place, and time. No distress.   HENT:   Facial sutures intact   Eyes:   Right eye patch   Bilateral periorbital ecchymosis    Neck: No JVD present.   Cardiovascular: Normal rate and regular rhythm.    Pulmonary/Chest: No respiratory distress. She exhibits no tenderness.   Abdominal: She exhibits no distension. There is no tenderness.   Musculoskeletal: Normal range of motion.   Neurological: " She is alert and oriented to person, place, and time.   Skin: Skin is warm and dry.   Bilateral knee ecchymosis    Nursing note and vitals reviewed.      Medical Decision Making/Problem List:    Active Hospital Problems    Diagnosis   • Ruptured globe of right eye [S05.31XA]     Priority: High     Rupture of right ocular globe with associated hemorrhage both intraocular and retrobulbar, with associated proptosis  4/20 - Tentatively to OR for enucleation  Rasta Hendreson MD. Opthalmology      • Facial fracture ([facility]) [S02.92XA]     Priority: Medium     Right inferior orbital wall fracture with inferior herniation of intraorbital fat  If eye is not salvageable, I will  sign off the case since there is nothing further for me to do  Tucker Beth MD. Plastic surgery     • Trauma [T14.90]     Priority: Low     Fall against object.   CT head and cervical spine negative       Core Measures & Quality Metrics:  Labs reviewed and Medications reviewed  Dumont catheter: No Dumont      DVT Prophylaxis: Contraindicated - High bleeding risk (OR tonight)  DVT prophylaxis - mechanical: SCDs  Ulcer prophylaxis: Not indicated  Antibiotics: Treating active infection/contamination beyond 24 hours perioperative coverage  Assessed for rehab: Patient returned to prior level of function, rehabilitation not indicated at this time    Total Score: 4    Discussed patient condition with RN, Patient and trauma surgery, Dr. Zay Calderon.  ETOH Screening  CAGE Score: 0  Intervention complete date: 4/20/2017  Patient response to intervention: Denies illicit drug and tobacco use. Drinks a martini followed by a glass of wine nightly.   Patient demonstrats understanding of intervention.Plan of care: patient refused substance abuse resources    has not been contacted.Follow up with: PCP and Clinic  Total ETOH intervention time: greater than 30 minutes

## 2017-04-20 NOTE — PROGRESS NOTES
Pt Shyla admitted to room T424 bed 1 via transport in [facility] from ER. Pt aao x 3--forgetful at times.  pain reported at 7 on a scale of 0-10 to right eye--medicated with morphine for pain. Oriented to room call light and smoking policy.  Reviewed plan of care (equipment, incentive spirometer, sequential compression devices, medications, activity, diet, fall precautions, skin care, and pain) with patient and family. Eye shield in place to right eye. Laceration to right forehead with sutures. Bed alarm place. Pt updated on plan of care.

## 2017-04-20 NOTE — PROGRESS NOTES
2 RN skin assessment preformed upon admission, facial trauma, lacerations, right eye patch, generalized bruising.

## 2017-04-20 NOTE — ED NOTES
Med Rec completed per patient/medication bottles  Allergies reviewed  No ORAL antibiotics in last 30 days

## 2017-04-20 NOTE — ED NOTES
Patient's son, Pino Ochoa, called and requested his number be put in the chart for access by , physicians, and nurses to keep him updated. Also advised that daughter will be on the way here. Son's phone number: [phone].

## 2017-04-20 NOTE — PROGRESS NOTES
Full note and exam will follow  Ruptured globe with orbital floor fx  Fx will not be addressed until we know eye can be salvaged   Will continue to follow along with primary team and opthalmology

## 2017-04-20 NOTE — H&P
HISTORY OF PRESENT ILLNESS:  This is a 76-year-old woman who was triaged here   as a trauma green.  She apparently fell and struck her face against hard   objects, she is not sure of what.  She states that she generally has poor   balance.  She does have some peripheral neuropathy as a sequelae of   chemotherapy for breast cancer.  She did not lose consciousness.  She   currently complains of pain around her right eye.  She denies any neck pain,   abdominal pain, numbness, tingling, or weakness.  Again, she did not lose   consciousness.    PAST MEDICAL HISTORY:  Significant for hypertension and peripheral neuropathy.    PAST SURGICAL HISTORY:  She has had a partial mastectomy.    MEDICATIONS:  Prior to this admission include:  1.  Gabapentin 300 mg t.i.d.  2.  Synthroid 50 mcg q.a.m.  3.  Lisinopril 20 mg daily.  4.  Metoprolol 50 mg everyday.  5.  Prilosec 20 mg daily.  6.  Probiotic.    ALLERGIES:  She has no stated drug allergies.    SOCIAL HISTORY:  She drinks approximately 2 drinks a day, does not smoke.    FAMILY HISTORY:  Essentially negative.    REVIEW OF SYSTEMS:  Baseline state of health prior to this again chronic   peripheral neuropathy in her feet and some imbalance otherwise negative per   AMA criteria.    PHYSICAL EXAMINATION:  VITAL SIGNS:  Here she has a temperature of 36.1, pulse of 90, and blood   pressure 130/75.  HEENT:  Remarkable for a patch type dressing over her right eye.  I did not   remove this.  Her left pupil is intact and reactive.  Her oropharynx is   without lesions.  NECK:  Nontender with range of motion.  PULMONARY:  Bilateral breath sounds, symmetrical chest excursion.  CARDIOVASCULAR:  Regular rate and rhythm.  ABDOMEN:  Soft and nontender.  Pelvis is stable.  EXTREMITIES:  No open wounds or deformities.  NEUROLOGIC:  She has a Ricardo coma scale of 15.  She has no grossly   detectable motor or sensory deficits in upper and lower extremities.  Her left   eye is reactive.  Right  eye was not assessed and apparently she has a   ruptured globe there.  SKIN:  Warm and dry.  VASCULAR:  She has palpable radial and femoral pulses.    LABORATORY DATA:  Includes a white count of 7.3, hematocrit of 41.8, and   platelets of 187.  Her INR is 0.94.    IMAGING:  She had a CT of her head, which was unremarkable.  CT of her C-spine   also did not show any fractures or subluxation.  It does show chronic   degenerative disease.  CT of her face does show a right inferior orbital wall   fracture with inferior herniation of intraorbital fat, also shows a rupture of   the right ocular globe with intraocular and retrobulbar hemorrhage with   associated proptosis.  There is also extra ocular hemorrhage within the medial   right orbit.  There is a fairly significant scalp laceration and right   periorbital facial and forehead soft tissue swelling.    IMPRESSION:  A 76-year-old woman status post a ground level fall and   unfortunately she did strike some type of hard object with right side of her face.    She does have injuries specifically a ruptured globe on the right with   associated orbital facial fractures and a laceration in that area.  She will   need operative treatment of these injuries.  Ophthalmology has consulted and   plastic surgery as well.  They will be coordinating her care.  In terms of   operative treatment of these injuries, she likely has lost the right eye and   may require enucleation, although certainly I defer to the ophthalmologist in   terms of definitively determining the above.  She is without evidence of any   other injuries.  She is otherwise quite stable and is a candidate for   admission to the floor and we will proceed in that fashion.    Total time in direct attendance with this injury or trauma patient is 35   minutes.       ____________________________________     MD FERNIE CHRISTIAN / MADHURI    DD:  04/20/2017 09:26:23  DT:  04/20/2017 09:58:26    D#:  [id number]  Job#:   [id number]

## 2017-04-20 NOTE — CONSULTS
DATE OF SERVICE:  04/20/2017    CHIEF COMPLAINT:  Right orbital fracture.    BRIEF HISTORY:  The patient is a 76-year-old female with history of chronic   alcoholism who apparently fell.  She struck her face against something hard.    She says she is not sure what it is.  She has pain, bleeding, and deformity of   her right eye and this is why she is brought into the hospital for.  She   denies loss of consciousness.    PAST MEDICAL HISTORY:  Significant for history of alcohol dependency,   hypertension, and peripheral neuropathy.    PAST SURGICAL HISTORY:  Significant for a partial mastectomy.    MEDICATIONS:  At the time of admission include gabapentin, Synthroid,   lisinopril, metoprolol, Prilosec, and probiotic.    ALLERGIES:  She has no allergies to medications.    SOCIAL HISTORY:  She does not smoke cigarettes.  She drinks at least a couple   of drinks a day according to her note, but her  says more.    REVIEW OF SYSTEMS:  Noncontributory.    PHYSICAL EXAMINATION:  GENERAL:  Reveals a female resting comfortably in her bed.  She is afebrile   and has stable vital signs.  She is alert and oriented to person, place, and   time.  HEENT:  Reveals eye matter emanating from the orbits.  The left eye is   unremarkable.  Extraocular motions are intact.  The left pupillary reflexes   normal.  There are no bony abnormalities of the facial skeleton.  Intraoral   examination reveals poor dentition.    LABORATORY DATA:  Review of the radiographs reveals an orbital floor fracture   with a moderate size defect.    ASSESSMENT AND PLAN:  The patient clearly has rupture of her globe and I do   not think this eye is ultimately going to be salvageable.  If that is the   case, there is no real urgency to repair her orbital floor and this can be   addressed during her nucleation with Dr. Daly.  If the patient's eye ultimately   is salvageable, the dimensions of the orbital floor fracture would warrant   surgical exploration and  repair.  This would not be done for probably a couple   of weeks to give the eye time to not be traumatized with my surgery.  I have   discussed this treatment course with the patient and patient's significant   other who is present.  They asked appropriate questions.  I will check back   with the patient in a couple of days.  If the eye is not salvageable, I will   sign off the case since there is nothing further for me to do.       ____________________________________     MD SONA DONNELLY / MADHURI    DD:  04/20/2017 11:36:36  DT:  04/20/2017 13:52:41    D#:  [id number]  Job#:  [id number]

## 2017-04-20 NOTE — DISCHARGE PLANNING
Medical Social Work    Pt arrived to [facility] s/p trauma green after falling at home with facial lacerations. Patients  was updated in the ER on pt's status by ER STEVE. Once pt arrived to [facility], pt was concerned where her  was as she had not seen him since she arrived in the ER. Per ER SW, pt was insisting that her  go home. Pt's  has dementia and per pt, is cared for by her 24/7.     SW called pt's neighbor listed on facesheet to check and see if he is currently at the house. Per neighbor, he is not at the house. The neighbor stated that they were with pt's  in the waiting room when she arrived and then left when the pt insisted that he go home. This SWer has also called pt's house number to locate pt, however there was no answer. Pt's  does not have a cell phone.     ER STEVE'er has contacted [employer] and has given them a description of pt's  as well as his car.     This SW'er has called pt's children-Cong and Pino regarding above information. Per Cong, she is on her way to [facility] right now.

## 2017-04-20 NOTE — ED NOTES
BRIEF OPHTHO NOTE    Complete note to follow    Reason for consult: right open globe s/p GLF, globe / orbit vs. Bed post    Exam: pt somnolent, C-spine not yet cleared.  Pt head and both eyes wrapped in bandages, no Camacho shield in place over rt orbit, gauze with mild pressure over exposed globe  .  Muscle light exam:  Removal of bandages reveals globe with diffuse 360 degree subconjunctival hemorrhage.  rupture of sclera medial globe, uveal contents protruding through defect. (consistent with CT scan per below)  Remainder of exam deferred until exploration can take place in operating room.  Camacho shield placed over rt orbit, secure with Transpore tape and bandages    A/P:  Right ruptured globe.  Will arrange for repair in OR ASAP.  In meantime: protect eye with Camacho shield in place at all times.  Admit to medicine service, NPO please.  Bedrest / bathroom priveleges only.  Systemic ABX: cefazolin 1 gram q 8 hours, with gatifloxacin or moxifloxacin 400 mg IV now and daily  AdministerTetanus,   Antiemetics to prevent futher expulsion of intraocular contents.  Continue with current pain medicine regime.  Anticipated time to OR: this afternoon, pending OR and physician availability.  Will verbalize orders to ER staff, as I am unable to write orders through [facility]        4/20/2017 7:46 AM    HISTORY/REASON FOR EXAM:  Pain/Deformity/Swelling Following Trauma.  Fall, head/face trauma    TECHNIQUE/EXAM DESCRIPTION AND NUMBER OF VIEWS:  CT scan maxillofacial/paranasal sinuses without contrast, with reconstructions.    Thin-section helical imaging was obtained of the maxillofacial structures including paranasal sinuses from the orbital roofs through the mandible. Coronal and sagittal multiplanar volume reformat images were generated from the axial data.    Low dose optimization technique was utilized for this CT exam including automated exposure control and adjustment of the mA and/or kV according to patient size.    COMPARISON:  None.    FINDINGS:  The mandible is intact.  Temporal mandibular joints are normally located.  Zygomatic arches are intact.  Displaced fracture of the RIGHT inferior orbital wall.  Orbital fat extends inferiorly into the maxillary sinus.  Markedly distorted RIGHT ocular globe with hyperdense material consistent with hemorrhage both within and adjacent to the globe.  Medial intraorbital, extraconal hemorrhage in the RIGHT orbit.  Retrobulbar hemorrhage present as well.  RIGHT periorbital soft tissue swelling extending into the face.  LEFT orbit is intact.  LEFT orbital contents are unremarkable.  Forehead scalp swelling with laceration.         Impression        1.  RIGHT inferior orbital wall fracture with inferior herniation of intraorbital fat.  2.  Rupture of RIGHT ocular globe with associated hemorrhage both intraocular and retrobulbar, with associated proptosis.  3.  Hemorrhage within the medial RIGHT orbit, extraconal.  4.  RIGHT periorbital, facial and forehead extracranial soft tissue swelling with forehead scalp laceration.    Patient was examined and I agree with Dr. Henderson's impression and plan.

## 2017-04-20 NOTE — DISCHARGE PLANNING
SW met with pts  in the lobby and provided him with an update after getting pts verbal consent to do so. SW informed pts spouse that he cannot see the pt at this time, as she is agitated. Pts spouse stated understanding and reported that he will stay in the lobby until he is able to go back to bedside. SW informed him that she is unsure of when that will be. Spouse stated understanding.

## 2017-04-20 NOTE — ED PROVIDER NOTES
"ED Provider Note    Scribed for Andrzej Guadalupe M.D. by Kristen Nunez. 4/20/2017  7:32 AM    Primary care provider: No primary care provider on file.  Means of arrival: EMS  History obtained from: EMS  History limited by: None    CHIEF COMPLAINT  Trauma Green.     DOTTIE Barrett is a 89 y.o. unknown who presents to the Emergency Department as a trauma green by EMS after a ground level fall, onset just prior to arrival. Per EMS, the patient fell into a metal bed post and hit her right eye and forehead. Loss of consciousness is unknown. The patient might have been drinking. She is currently is in a lot of pain and states that she cannot see out of her right eye.     REVIEW OF SYSTEMS  Pertinent positives include loss of vision to right eye, facial pain.     All other systems reviewed and negative.      PAST MEDICAL HISTORY   No pertinent past medical history.     SURGICAL HISTORY  patient denies any surgical history    SOCIAL HISTORY  Drinks alcohol.     FAMILY HISTORY  No pertinent family history.     CURRENT MEDICATIONS  No current medications noted .       ALLERGIES  No Known Allergies    PHYSICAL EXAM  VITAL SIGNS: /89 mmHg  Pulse 96  Temp(Src) 36.1 °C (97 °F)  Resp 18  Ht 1.753 m (5' 9.02\")  Wt 70.308 kg (155 lb)  BMI 22.88 kg/m2  SpO2 98%    Nursing note and vitals reviewed.  Constitutional: Well-developed and well-nourished. Moderate distress. Slurred and intoxicated speech. In full spinal precaution.  HENT: Head is normocephalic and atraumatic. Oropharynx is clear and moist without exudate or erythema. 6 cm left forehead laceration.   Eyes: Ruptured right globe. Periorbital ecchymosis.  Left pupil is round and reactive.   Cardiovascular: Normal rate and regular rhythm. No murmur heard. Normal radial pulses.  Pulmonary/Chest: Breath sounds normal. No wheezes or rales.   Abdominal: Soft and non-tender. No distention    Musculoskeletal: Extremities exhibit normal range of motion " without edema or tenderness.   Neurological: Awake, alert and oriented to person, place, and time. No focal deficits noted.  Skin: Skin is warm and dry. No rash.   Psychiatric: Normal mood and affect. Appropriate for clinical situation    DIAGNOSTIC STUDIES / PROCEDURES    LABS  Results for orders placed or performed during the hospital encounter of 04/20/17   CBC WITHOUT DIFFERENTIAL   Result Value Ref Range    WBC 7.3 4.8 - 10.8 K/uL    RBC 4.18 (L) 4.20 - 5.40 M/uL    Hemoglobin 13.5 12.0 - 16.0 g/dL    Hematocrit 41.8 37.0 - 47.0 %    .0 (H) 81.4 - 97.8 fL    MCH 32.3 27.0 - 33.0 pg    MCHC 32.3 (L) 33.6 - 35.0 g/dL    RDW 68.5 (H) 35.9 - 50.0 fL    Platelet Count 187 164 - 446 K/uL    MPV 10.1 9.0 - 12.9 fL       All labs reviewed by me.    RADIOLOGY  CT-MAXILLOFACIAL W/O PLUS RECONS   Final Result   Addendum 1 of 1   These findings were discussed with JENNY MENDOZA on 4/20/2017 8:18 AM.      Final      1.  RIGHT inferior orbital wall fracture with inferior herniation of intraorbital fat.   2.  Rupture of RIGHT ocular globe with associated hemorrhage both intraocular and retrobulbar, with associated proptosis.   3.  Hemorrhage within the medial RIGHT orbit, extraconal.   4.  RIGHT periorbital, facial and forehead extracranial soft tissue swelling with forehead scalp laceration.      CT-HEAD W/O   Final Result      1.  Negative for intracranial hemorrhage      2.  Underlying white matter change and cerebral volume loss      3.  Facial findings include hematoma, rupture of the right lobe, right orbital fracture, and hemorrhage in the right maxillary sinus      CT-CSPINE WITHOUT PLUS RECONS   Final Result      1.  Multilevel degenerative change of cervical spine with accentuated lordosis.   2.  No acute fracture or subluxation.      DX-CHEST-PORTABLE (1 VIEW)   Final Result         1. Patchy bilateral opacities could relate to atelectasis.        The radiologist's interpretation of all radiological  studies have been reviewed by me.      Laceration Repair Procedure Note    Indication: Laceration    Procedure: The patient was placed in the appropriate position and anesthesia around the laceration was obtained by infiltration using 2% Lidocaine with epinephrine. The area was then irrigated with high pressure normal saline. The laceration was closed with 5-0 Ethilon using interrupted sutures. There were no additional lacerations requiring repair. The wound area was then dressed with gauze.      Total repaired wound length: 6 cm.     Other Items: Suture count: 8    The patient tolerated the procedure well.    Complications: None        COURSE & MEDICAL DECISION MAKING  Nursing notes, VS, PMSFHx reviewed in chart.       7:32 AM - Patient seen and examined at bedside. Patient will be treated with 0.5 ml Adacel, 1mg/ml Hydromorphone, 4mg/ml Ondansetron. Ordered DX_chest, Ct-Cspine, CT-Head, CT-Maxillofacial, Diagnostic alcohol, CBC, CMP, APTT, Prothrombin, HCG Qual serum, Basic TEG to evaluate his symptoms. The differential diagnoses include but are not limited to: Intracranial hemorrhage, skull fracture, open globe, facial fractures, cervical spine fracture     7:35 AM Paged Opthalmology.    7:40 AM - I discussed the patient's case and the above findings with Dr. Henderson (Opthalmology) who agrees to see the patient.     8:19 AM Paged Facial Fracture and Trauma.     8:29 AM - I discussed the patient's case and the above findings with Dr. Beth (Facial Fracture) who agrees to see the patient.     8:30 AM - I discussed the patient's case and the above findings with Dr. Calderon (Trauma) who agrees to see the patient.       9:31 AM - I discussed the patient's case and the above findings with Dr. Henderson (Opthalmology) who will preform surgery on her eye. He requests the patient is started on IV antibiotics including Cefazolin and Moxifloxacin.       10:40 AM - I was notified by Opthalmology that they will not repair the  forehead laceration in surgery so I will repair the laceration in the ED.      11:56 AM laceration was repaired as above. Patient will be admitted for further care.    CRITICAL CARE  I provided critical care services, which included medication orders, frequent reevaluations of the patient's condition and response to treatment, ordering and reviewing test results, and discussing the case with various consultants.  The critical care time associated with the care of the patient was 35 minutes. Review chart for interventions. This time is exclusive of any other billable procedures.        DISPOSITION:  Patient will be admitted to Dr. Henderson (Opthalmology) in guarded condition.      FINAL IMPRESSION  1. Ruptured globe, right eye, initial encounter    2. Closed fracture of orbital floor, initial encounter ([facility])    3. Forehead laceration, initial encounter    4. Alcohol intoxication, with unspecified complication (CMS-HCC)          Kristen DICKINSON (Scribe), am scribing for, and in the presence of, Andrzej Guadalupe M.D..    Electronically signed by: Kristen Nunez (Scribe), 4/20/2017    IAndrzej M.D. personally performed the services described in this documentation, as scribed by Kristen Nunez in my presence, and it is both accurate and complete.    The note accurately reflects work and decisions made by me.  Andrzej Guadalupe  4/20/2017  11:56 AM

## 2017-04-21 LAB
ALBUMIN SERPL BCP-MCNC: 3.7 G/DL (ref 3.2–4.9)
ALBUMIN/GLOB SERPL: 1.5 G/DL
ALP SERPL-CCNC: 87 U/L (ref 30–99)
ALT SERPL-CCNC: 16 U/L (ref 2–50)
ANION GAP SERPL CALC-SCNC: 10 MMOL/L (ref 0–11.9)
AST SERPL-CCNC: 36 U/L (ref 12–45)
BASOPHILS # BLD AUTO: 0.1 % (ref 0–1.8)
BASOPHILS # BLD: 0.01 K/UL (ref 0–0.12)
BILIRUB SERPL-MCNC: 0.7 MG/DL (ref 0.1–1.5)
BUN SERPL-MCNC: 21 MG/DL (ref 8–22)
CALCIUM SERPL-MCNC: 7.9 MG/DL (ref 8.5–10.5)
CHLORIDE SERPL-SCNC: 102 MMOL/L (ref 96–112)
CO2 SERPL-SCNC: 23 MMOL/L (ref 20–33)
CREAT SERPL-MCNC: 0.86 MG/DL (ref 0.5–1.4)
EOSINOPHIL # BLD AUTO: 0 K/UL (ref 0–0.51)
EOSINOPHIL NFR BLD: 0 % (ref 0–6.9)
ERYTHROCYTE [DISTWIDTH] IN BLOOD BY AUTOMATED COUNT: 68.6 FL (ref 35.9–50)
GFR SERPL CREATININE-BSD FRML MDRD: >60 ML/MIN/1.73 M 2
GLOBULIN SER CALC-MCNC: 2.5 G/DL (ref 1.9–3.5)
GLUCOSE SERPL-MCNC: 139 MG/DL (ref 65–99)
HCT VFR BLD AUTO: 34.2 % (ref 37–47)
HGB BLD-MCNC: 11.1 G/DL (ref 12–16)
IMM GRANULOCYTES # BLD AUTO: 0.05 K/UL (ref 0–0.11)
IMM GRANULOCYTES NFR BLD AUTO: 0.4 % (ref 0–0.9)
LYMPHOCYTES # BLD AUTO: 0.25 K/UL (ref 1–4.8)
LYMPHOCYTES NFR BLD: 2.1 % (ref 22–41)
MCH RBC QN AUTO: 31.9 PG (ref 27–33)
MCHC RBC AUTO-ENTMCNC: 31.4 G/DL (ref 33.6–35)
MCV RBC AUTO: 101.5 FL (ref 81.4–97.8)
MONOCYTES # BLD AUTO: 1.13 K/UL (ref 0–0.85)
MONOCYTES NFR BLD AUTO: 9.4 % (ref 0–13.4)
NEUTROPHILS # BLD AUTO: 10.53 K/UL (ref 2–7.15)
NEUTROPHILS NFR BLD: 88 % (ref 44–72)
NRBC # BLD AUTO: 0 K/UL
NRBC BLD AUTO-RTO: 0 /100 WBC
PLATELET # BLD AUTO: 144 K/UL (ref 164–446)
PMV BLD AUTO: 10.3 FL (ref 9–12.9)
POTASSIUM SERPL-SCNC: 4.4 MMOL/L (ref 3.6–5.5)
PROT SERPL-MCNC: 6.2 G/DL (ref 6–8.2)
RBC # BLD AUTO: 3.39 M/UL (ref 4.2–5.4)
SODIUM SERPL-SCNC: 135 MMOL/L (ref 135–145)
WBC # BLD AUTO: 12 K/UL (ref 4.8–10.8)

## 2017-04-21 PROCEDURE — 700111 HCHG RX REV CODE 636 W/ 250 OVERRIDE (IP): Performed by: NURSE PRACTITIONER

## 2017-04-21 PROCEDURE — 97162 PT EVAL MOD COMPLEX 30 MIN: CPT

## 2017-04-21 PROCEDURE — 700111 HCHG RX REV CODE 636 W/ 250 OVERRIDE (IP): Performed by: OPHTHALMOLOGY

## 2017-04-21 PROCEDURE — 700112 HCHG RX REV CODE 229: Performed by: SURGERY

## 2017-04-21 PROCEDURE — 770006 HCHG ROOM/CARE - MED/SURG/GYN SEMI*

## 2017-04-21 PROCEDURE — 700102 HCHG RX REV CODE 250 W/ 637 OVERRIDE(OP): Performed by: SURGERY

## 2017-04-21 PROCEDURE — A9270 NON-COVERED ITEM OR SERVICE: HCPCS | Performed by: SURGERY

## 2017-04-21 PROCEDURE — 97165 OT EVAL LOW COMPLEX 30 MIN: CPT

## 2017-04-21 PROCEDURE — G8978 MOBILITY CURRENT STATUS: HCPCS | Mod: CJ

## 2017-04-21 PROCEDURE — G8979 MOBILITY GOAL STATUS: HCPCS | Mod: CI

## 2017-04-21 PROCEDURE — 80053 COMPREHEN METABOLIC PANEL: CPT

## 2017-04-21 PROCEDURE — 700111 HCHG RX REV CODE 636 W/ 250 OVERRIDE (IP): Performed by: SURGERY

## 2017-04-21 PROCEDURE — 36415 COLL VENOUS BLD VENIPUNCTURE: CPT

## 2017-04-21 PROCEDURE — 85025 COMPLETE CBC W/AUTO DIFF WBC: CPT

## 2017-04-21 PROCEDURE — 700102 HCHG RX REV CODE 250 W/ 637 OVERRIDE(OP): Performed by: NURSE PRACTITIONER

## 2017-04-21 PROCEDURE — A9270 NON-COVERED ITEM OR SERVICE: HCPCS | Performed by: NURSE PRACTITIONER

## 2017-04-21 PROCEDURE — G8988 SELF CARE GOAL STATUS: HCPCS | Mod: CI

## 2017-04-21 PROCEDURE — G8987 SELF CARE CURRENT STATUS: HCPCS | Mod: CJ

## 2017-04-21 RX ORDER — TRAZODONE HYDROCHLORIDE 50 MG/1
50 TABLET ORAL NIGHTLY PRN
Status: DISCONTINUED | OUTPATIENT
Start: 2017-04-21 | End: 2017-04-24 | Stop reason: HOSPADM

## 2017-04-21 RX ORDER — LEVOFLOXACIN 500 MG/1
750 TABLET, FILM COATED ORAL DAILY
Status: DISCONTINUED | OUTPATIENT
Start: 2017-04-22 | End: 2017-04-24 | Stop reason: HOSPADM

## 2017-04-21 RX ADMIN — METOPROLOL SUCCINATE 50 MG: 50 TABLET, EXTENDED RELEASE ORAL at 08:26

## 2017-04-21 RX ADMIN — GABAPENTIN 300 MG: 300 CAPSULE ORAL at 21:17

## 2017-04-21 RX ADMIN — SODIUM CHLORIDE, POTASSIUM CHLORIDE, SODIUM LACTATE AND CALCIUM CHLORIDE: 600; 310; 30; 20 INJECTION, SOLUTION INTRAVENOUS at 05:29

## 2017-04-21 RX ADMIN — OXYCODONE HYDROCHLORIDE 10 MG: 5 TABLET ORAL at 14:33

## 2017-04-21 RX ADMIN — TRAZODONE HYDROCHLORIDE 50 MG: 50 TABLET ORAL at 21:17

## 2017-04-21 RX ADMIN — GABAPENTIN 300 MG: 300 CAPSULE ORAL at 16:10

## 2017-04-21 RX ADMIN — STANDARDIZED SENNA CONCENTRATE AND DOCUSATE SODIUM 1 TABLET: 8.6; 5 TABLET, FILM COATED ORAL at 21:17

## 2017-04-21 RX ADMIN — POLYETHYLENE GLYCOL 3350 1 PACKET: 17 POWDER, FOR SOLUTION ORAL at 08:32

## 2017-04-21 RX ADMIN — GABAPENTIN 300 MG: 300 CAPSULE ORAL at 08:26

## 2017-04-21 RX ADMIN — OXYCODONE HYDROCHLORIDE 5 MG: 5 TABLET ORAL at 05:28

## 2017-04-21 RX ADMIN — DOCUSATE SODIUM 100 MG: 100 CAPSULE ORAL at 21:17

## 2017-04-21 RX ADMIN — LEVOFLOXACIN 750 MG: 750 INJECTION, SOLUTION INTRAVENOUS at 08:27

## 2017-04-21 RX ADMIN — POLYETHYLENE GLYCOL 3350 1 PACKET: 17 POWDER, FOR SOLUTION ORAL at 21:17

## 2017-04-21 RX ADMIN — LISINOPRIL 20 MG: 20 TABLET ORAL at 08:26

## 2017-04-21 RX ADMIN — OXYCODONE HYDROCHLORIDE 5 MG: 5 TABLET ORAL at 01:31

## 2017-04-21 RX ADMIN — SODIUM CHLORIDE, POTASSIUM CHLORIDE, SODIUM LACTATE AND CALCIUM CHLORIDE: 600; 310; 30; 20 INJECTION, SOLUTION INTRAVENOUS at 21:22

## 2017-04-21 RX ADMIN — LEVOTHYROXINE SODIUM 50 MCG: 50 TABLET ORAL at 05:28

## 2017-04-21 RX ADMIN — SODIUM CHLORIDE, POTASSIUM CHLORIDE, SODIUM LACTATE AND CALCIUM CHLORIDE: 600; 310; 30; 20 INJECTION, SOLUTION INTRAVENOUS at 21:21

## 2017-04-21 RX ADMIN — DOCUSATE SODIUM 100 MG: 100 CAPSULE ORAL at 08:27

## 2017-04-21 RX ADMIN — CEFAZOLIN SODIUM 1 G: 1 INJECTION, SOLUTION INTRAVENOUS at 05:28

## 2017-04-21 ASSESSMENT — ENCOUNTER SYMPTOMS
EYE PAIN: 1
ABDOMINAL PAIN: 0
VOMITING: 0
SPEECH CHANGE: 0
HEADACHES: 0
TREMORS: 0
MYALGIAS: 1
FOCAL WEAKNESS: 0
CHILLS: 0
NAUSEA: 0
SHORTNESS OF BREATH: 0
FEVER: 0
INSOMNIA: 1

## 2017-04-21 ASSESSMENT — LIFESTYLE VARIABLES
DOES PATIENT WANT TO STOP DRINKING: NO
HEADACHE, FULLNESS IN HEAD: NOT PRESENT
ON A TYPICAL DAY WHEN YOU DRINK ALCOHOL HOW MANY DRINKS DO YOU HAVE: 2
HEADACHE, FULLNESS IN HEAD: NOT PRESENT
HEADACHE, FULLNESS IN HEAD: VERY MILD
EVER HAD A DRINK FIRST THING IN THE MORNING TO STEADY YOUR NERVES TO GET RID OF A HANGOVER: NO
ORIENTATION AND CLOUDING OF SENSORIUM: ORIENTED AND CAN DO SERIAL ADDITIONS
AGITATION: NORMAL ACTIVITY
PAROXYSMAL SWEATS: NO SWEAT VISIBLE
HEADACHE, FULLNESS IN HEAD: VERY MILD
TREMOR: *
TREMOR: TREMOR NOT VISIBLE BUT CAN BE FELT, FINGERTIP TO FINGERTIP
VISUAL DISTURBANCES: NOT PRESENT
PAROXYSMAL SWEATS: NO SWEAT VISIBLE
DO YOU DRINK ALCOHOL: YES
VISUAL DISTURBANCES: NOT PRESENT
TOTAL SCORE: 3
TOTAL SCORE: 0
HAVE YOU EVER FELT YOU SHOULD CUT DOWN ON YOUR DRINKING: NO
ANXIETY: MILDLY ANXIOUS
VISUAL DISTURBANCES: NOT PRESENT
DOES PATIENT WANT TO STOP DRINKING: NO
VISUAL DISTURBANCES: NOT PRESENT
ANXIETY: MILDLY ANXIOUS
TOTAL SCORE: 2
ANXIETY: MILDLY ANXIOUS
ORIENTATION AND CLOUDING OF SENSORIUM: ORIENTED AND CAN DO SERIAL ADDITIONS
TOTAL SCORE: 0
ORIENTATION AND CLOUDING OF SENSORIUM: ORIENTED AND CAN DO SERIAL ADDITIONS
ORIENTATION AND CLOUDING OF SENSORIUM: ORIENTED AND CAN DO SERIAL ADDITIONS
TOTAL SCORE: 0
HOW MANY TIMES IN THE PAST YEAR HAVE YOU HAD 5 OR MORE DRINKS IN A DAY: 0
NAUSEA AND VOMITING: NO NAUSEA AND NO VOMITING
TOTAL SCORE: 4
AGITATION: NORMAL ACTIVITY
ANXIETY: MILDLY ANXIOUS
HEADACHE, FULLNESS IN HEAD: VERY MILD
HAVE PEOPLE ANNOYED YOU BY CRITICIZING YOUR DRINKING: NO
AUDITORY DISTURBANCES: NOT PRESENT
AUDITORY DISTURBANCES: NOT PRESENT
TREMOR: TREMOR NOT VISIBLE BUT CAN BE FELT, FINGERTIP TO FINGERTIP
AUDITORY DISTURBANCES: NOT PRESENT
NAUSEA AND VOMITING: NO NAUSEA AND NO VOMITING
ORIENTATION AND CLOUDING OF SENSORIUM: ORIENTED AND CAN DO SERIAL ADDITIONS
AGITATION: NORMAL ACTIVITY
VISUAL DISTURBANCES: NOT PRESENT
PAROXYSMAL SWEATS: NO SWEAT VISIBLE
AGITATION: NORMAL ACTIVITY
ANXIETY: NO ANXIETY (AT EASE)
AUDITORY DISTURBANCES: NOT PRESENT
AGITATION: NORMAL ACTIVITY
CONSUMPTION TOTAL: POSITIVE
EVER FELT BAD OR GUILTY ABOUT YOUR DRINKING: NO
PAROXYSMAL SWEATS: NO SWEAT VISIBLE
TREMOR: TREMOR NOT VISIBLE BUT CAN BE FELT, FINGERTIP TO FINGERTIP
AVERAGE NUMBER OF DAYS PER WEEK YOU HAVE A DRINK CONTAINING ALCOHOL: 7
AUDITORY DISTURBANCES: NOT PRESENT
TREMOR: TREMOR NOT VISIBLE BUT CAN BE FELT, FINGERTIP TO FINGERTIP
TOTAL SCORE: 2
TOTAL SCORE: 2
NAUSEA AND VOMITING: NO NAUSEA AND NO VOMITING
NAUSEA AND VOMITING: NO NAUSEA AND NO VOMITING
PAROXYSMAL SWEATS: NO SWEAT VISIBLE
NAUSEA AND VOMITING: NO NAUSEA AND NO VOMITING

## 2017-04-21 ASSESSMENT — PATIENT HEALTH QUESTIONNAIRE - PHQ9
2. FEELING DOWN, DEPRESSED, IRRITABLE, OR HOPELESS: NOT AT ALL
1. LITTLE INTEREST OR PLEASURE IN DOING THINGS: NOT AT ALL
SUM OF ALL RESPONSES TO PHQ9 QUESTIONS 1 AND 2: 0
SUM OF ALL RESPONSES TO PHQ QUESTIONS 1-9: 0

## 2017-04-21 ASSESSMENT — PAIN SCALES - GENERAL
PAINLEVEL_OUTOF10: 7
PAINLEVEL_OUTOF10: 1
PAINLEVEL_OUTOF10: 3

## 2017-04-21 ASSESSMENT — GAIT ASSESSMENTS
DEVIATION: DECREASED BASE OF SUPPORT
DISTANCE (FEET): 200
ASSISTIVE DEVICE: FRONT WHEEL WALKER
GAIT LEVEL OF ASSIST: CONTACT GUARD ASSIST

## 2017-04-21 ASSESSMENT — COPD QUESTIONNAIRES
COPD SCREENING SCORE: 2
DO YOU EVER COUGH UP ANY MUCUS OR PHLEGM?: NO/ONLY WITH OCCASIONAL COLDS OR INFECTIONS
HAVE YOU SMOKED AT LEAST 100 CIGARETTES IN YOUR ENTIRE LIFE: NO/DON'T KNOW
DURING THE PAST 4 WEEKS HOW MUCH DID YOU FEEL SHORT OF BREATH: NONE/LITTLE OF THE TIME

## 2017-04-21 ASSESSMENT — ACTIVITIES OF DAILY LIVING (ADL): TOILETING: INDEPENDENT

## 2017-04-21 NOTE — DISCHARGE PLANNING
Medical Social Work    SW Intern Giovana received a call from pt's daughter stating that they have found pt's .

## 2017-04-21 NOTE — PROCEDURES
Brief Op Report  Preop diagnosis: Scleral laceration with uveal prolapse right eye  Post diagnosis: same  Procedure: Repair of scleral alceration right eye  Surgeon: Dinesh  Anesthesia: ALEXIS  Anesthesiologist: General  Complications: None

## 2017-04-21 NOTE — PROGRESS NOTES
VSS. A+Ox4. Pt reports pain to R eye; oxycodone with good effect. Eye shield in place to R eye. Pt on CIWA protocol; scoring 4-6. Medicated per protocol. Up to BSC. Voiding. Ambulating with 1 assist. Hourly rounding ongoing. Call bell in reach. Bed alarm on for safety.

## 2017-04-21 NOTE — CARE PLAN
Problem: Communication  Goal: The ability to communicate needs accurately and effectively will improve  Outcome: PROGRESSING AS EXPECTED  Pt able to communicate all needs accurately. POC discussed. Pt verbalizes understanding.     Problem: Safety  Goal: Will remain free from falls  Outcome: PROGRESSING AS EXPECTED  Pt remains free from falls. Calls appropriately for assistance.     Problem: Infection  Goal: Will remain free from infection  Outcome: PROGRESSING AS EXPECTED  No s/s infection. Continues on IV abc.     Problem: Pain Management  Goal: Pain level will decrease to patient’s comfort goal  Outcome: PROGRESSING AS EXPECTED  Pain managed with current regimen.

## 2017-04-21 NOTE — DISCHARGE PLANNING
Bedside RN stated that pts spouse is at bedside. Pt reports that her  has alzheimer's and that she is his fulltime caregiver. SW met with pt at bedside who confirmed this. Pt stated that her daughter Odilia is coming to [place] to take care of her  until she is d/cd from the hospital. Pt reported having no further needs at this time. Per another ER SW, the pts  removed his car from [facility] and left from [facility], despite being asked to stay.    SW received a call from the unit SW who reported that the pt is concerned about her , as he left for home and has not returned. Per unit SW, the pts neighbors were called and reported that the pts spouse has not returned home. SW called [employer] and filed a PD report with the description of pts spouse and the car that he was driving. [employer] reported that they would send out an alert and contact SW when the pts spouse was found.    STEVE met with pts daughter Odilia ([phone]), who provided further information on the pts car. STEVE provided her an update on the events above, and informed her that [employer] was currently on the lookout for her father. Odilia reported that she would be calling dispatch and making an additional report. STEVE provided support and encouraged follow up after Odilia stated that she had no further needs at this time. STEVE passed information to PM STEVE for follow up.

## 2017-04-21 NOTE — OP REPORT
DATE OF SERVICE:  04/20/2017    PREOPERATIVE DIAGNOSES:  Trauma with scleral laceration and ruptured globe   with uveal exposure right    POSTOPERATIVE DIAGNOSES:  Trauma with scleral laceration and ruptured globe   with uveal exposure right    PROCEDURE:  Repair of scleral laceration, right eye.    SURGEONS:  Rasta Henderson MD and Richard Talbert MD    ANESTHESIOLOGIST:  Mike Erwin DO    ANESTHESIA:  General endotracheal.    FLUIDS:  See anesthesia note.    COMPLICATIONS:  None.    INDICATIONS:  The patient who had a fall resulting in a ruptured laceration of   the globe, scleral laceration with uveal exposure.  Risks, benefits, and   alternatives of surgery were discussed with the patient and daughter, patient   decided to proceed with the surgery.    DETAILS OF THE PROCEDURE:  The correct eye was marked in the preop area.    Patient was taken to the operating room.  General anesthesia was induced by   anesthesia.  Patient was prepped and draped in the usual sterile ophthalmic   fashion.  We noticed a large inferior nasal laceration with a large area of   the uveal exposure.  The laceration went from about 2 o'clock till 6 o'clock   with uveal exposure.  We used 8-0 nylon to close the sclera.  After 360   peritomy has been performed, we were able to put all the uveal tissue back in   the eye.  We attempted to wash out blood from the AC but was clotted.  Conjunctiva was then   closed with 7-0 Vicryl suture.  We were able to form the eye without any leak.    Patient tolerated the procedure well.  Postop Ancef and dexamethasone were   injected subconjunctivally.  We could not visualize the anterior chamber, but   because of the uveal exposure, the globe has a very limited potential.  There   were no complications.  The patient was taken to the recovery room in good   condition.       ____________________________________     MD HINA SAAVEDRA / MADHURI    DD:  04/20/2017 20:38:12  DT:   04/20/2017 20:59:57    D#:  [id number]  Job#:  [id number]

## 2017-04-21 NOTE — PROGRESS NOTES
"  Trauma/Surgical Progress Note    Author: Marcos Parsons Date & Time created: 4/21/2017   9:27 AM     Interval Events:    GCS 15. No overt signs of acute alcohol withdrawal.   PT/OT evaluations  Start pharmacological DVT prophylaxi  if ok with opthalmology   Counseled     Review of Systems   Constitutional: Negative for fever and chills.   HENT:        Facial fracture   Eyes: Positive for pain.        Baseline vision left eye  Right eye pain    Respiratory: Negative for shortness of breath.    Cardiovascular: Negative for chest pain.   Gastrointestinal: Negative for nausea, vomiting and abdominal pain.   Genitourinary: Negative for dysuria.   Musculoskeletal: Positive for myalgias.   Skin:        Bilateral knee bruising. Per patient from previous fall.   Neurological: Negative for tremors, speech change, focal weakness and headaches.   Psychiatric/Behavioral: The patient has insomnia.      Hemodynamics:  Blood pressure 142/82, pulse 97, temperature 36.2 °C (97.1 °F), resp. rate 18, height 1.753 m (5' 9.02\"), weight 63.3 kg (139 lb 8.8 oz), SpO2 99 %, not currently breastfeeding.     Respiratory:    Respiration: 18, Pulse Oximetry: 99 %, O2 Daily Delivery Respiratory : Silicone Nasal Cannula        RUL Breath Sounds: Clear, RML Breath Sounds: Clear, RLL Breath Sounds: Diminished, STEVEN Breath Sounds: Clear, LLL Breath Sounds: Diminished  Fluids:    Intake/Output Summary (Last 24 hours) at 04/21/17 0939  Last data filed at 04/21/17 0740   Gross per 24 hour   Intake   1800 ml   Output    800 ml   Net   1000 ml     Admit Weight: 70.308 kg (155 lb) (estimated)  Current Weight: 63.3 kg (139 lb 8.8 oz)    Physical Exam   Constitutional: She is oriented to person, place, and time. No distress.   HENT:   Facial sutures intact   Eyes:   Right eye patch   Bilateral periorbital ecchymosis    Neck: No JVD present.   Cardiovascular: Normal rate and regular rhythm.    Pulmonary/Chest: No respiratory distress. She exhibits no " tenderness.   Abdominal: She exhibits no distension. There is no tenderness.   Musculoskeletal: Normal range of motion.   Neurological: She is alert and oriented to person, place, and time.   Skin: Skin is warm and dry.   Bilateral knee ecchymosis    Nursing note and vitals reviewed.      Medical Decision Making/Problem List:    Active Hospital Problems    Diagnosis   • Ruptured globe of right eye [S05.31XA]     Priority: High     Rupture of right ocular globe with associated hemorrhage both intraocular and retrobulbar, with associated proptosis  4/20 - Repair of scleral alceration right eye  Richard Talbert MD. Opthalmology       • Facial fracture (CMS-[facility]) [S02.92XA]     Priority: Medium     Right inferior orbital wall fracture with inferior herniation of intraorbital fat  Will likely require operative repair in a couple of weeks  Tucker Beth MD. Plastic surgery      • Trauma [T14.90]     Priority: Low     Fall against object.   CT head and cervical spine negative      • Inadequate anticoagulation [Z51.81, Z79.01]     Priority: Low     RAP score 4  4/21 - Initiate lovenox when cleared by opthalmology   Lower extremity sonogram as clinically indicated.        Core Measures & Quality Metrics:  Labs reviewed and Medications reviewed  Dumont catheter: No Dumont      DVT Prophylaxis: Contraindicated - High bleeding risk (OR tonight)  DVT prophylaxis - mechanical: SCDs  Ulcer prophylaxis: Not indicated  Antibiotics: Treating active infection/contamination beyond 24 hours perioperative coverage  Assessed for rehab: Patient returned to prior level of function, rehabilitation not indicated at this time    Total Score: 4    Discussed patient condition with RN, Patient and trauma surgery, Dr. Zay Calderon.    Seen on rounds  Clinically stable  Will need rehab  Discussed with katy and Marcos Calderon MD

## 2017-04-21 NOTE — PROGRESS NOTES
OPHTHO NOTE:    S: 1 day s/p GLF with resulting rt open globe injury / scleral laceration.   0.5 day s/p repair of open globe.    Current pain: 3/10, oxycodone working well. C/o mostly of itchiness to eye. Able to keep eye patch in place all night w/o difficulty. Currently no nausea, wants to eat breakfast    O: patch removed for exam  VA: hand motions at 2 inches  Pupils: unable to observe due to hyphema / corneal edema  IOP: 12    Exam w/ direct ophthalmoscope light:  L/L: bilat periorbital ecchymosis / edema  C/s: diffuse subconj heme, diffuse chemosis alejandro inferiorly, lid unable to cover about lower 2 mm of conj due to same.  Conj is closed, no leaks w/ fluoresciene testing  Cornea: diffuse folds, diffuse edema  AC: hyphema, o/w unable to view adequately  Iris: no view  Lens: no view    Right eye patch replaced after copious Tobradex ointment applied to surface of eye,   using new eye pads after exam    A/P:   1 day s/p GLF with resulting rt open globe injury / scleral laceration.   0.5 day s/p repair of open globe. Doing well, pain well controlled.   Prognosis still poor, time will tell whether enucleation will be needed or not.  From eye standpoint, she can go home with oral pain nausea meds, keeping eye patched at all times.  Please Rx PO moxifloxacin if she does go hime. I will see her tomorrow, on the floor if she is still here,   In my clinic if she is discharged. Will communicate w/ primary team my findings.  I can be reached at [phone]

## 2017-04-21 NOTE — THERAPY
"Physical Therapy Evaluation completed.   Bed Mobility:     Transfers: Sit to Stand: Contact Guard Assist  Gait: Level Of Assist: Contact Guard Assist with Front-Wheel Walker       Plan of Care: Will benefit from Physical Therapy 3 times per week  Discharge Recommendations: Equipment: Will Continue to Assess for Equipment Needs. Post-acute therapy Discharge to a transitional care facility for continued skilled therapy services.    Pt is a pleasant 75 yo F who lives with her  who she reports has a Hx of mild Alzheimer's.  Pt presented to [facility] after a fall at home injuring her R eye.  Pt currently is unable to see out of her R eye as she has a patch.  Pt has a Hx of multiple recent falls as well.  Pt presented requriring CGA for transfers and gait while using a FWW.  Pt will need to practice scanning her environment to help decrease fall risks.  Pt reports her balance has been impaired at baseline as she has a Hx of neuropathy.  Pt will continue to benefit from skilled PT to increase safety and independence with funcitonal mobility.  Pt may benefit from intensive therapies to increase safety and independence and further address her above deficits prior to return home.    See \"Rehab Therapy-Acute\" Patient Summary Report for complete documentation.     "

## 2017-04-21 NOTE — PROGRESS NOTES
Pt sitting on side of bed visiting w/ and daughter.  Pt is able to eat a regular diet, denies nausea.  Pt does not request pain medications.  Pt voids in BR, no BM reported

## 2017-04-21 NOTE — PROGRESS NOTES
Pt to pre op holding via bed with transport. Pt voided in bsc prior to leaving. Eye shield remains in place. Pt's daughter Cong now at bedside. IV ancef infusing on departure.

## 2017-04-21 NOTE — DISCHARGE PLANNING
"Medical Social Work:    SW found Pt.  in the hallway and attempted to re-direct back to his wife's room.  stated he was heading home. SW able to walk  back to wife's room. Once we entered room wife state \" I thought you were going home\" . Sw clarified with wife if  should be driving home and wife stated \"He has a drivers license, He is a very good  and he will be fine.\"  Sw made multiple attempts to talk to  and tell him daughter was on her way to [facility] and he should wait for her. Both Pt and  insisted he needed to get home.    had  ticket in his wallet and proceeded to  to return home.   "

## 2017-04-21 NOTE — DISCHARGE PLANNING
Medical Social Work    Referral: Substance abuse resources     Intervention: Pt's family is highly concerned for pt as she came in with a CJ of .34. Family requested this SW'er speak to pt regarding this due to pt's hx of falls related to alcohol.     Met w/ pt, pt's , and pt's daughter Cong at bedside. Per pt, she plans to try and stop drinking for a month and see if that helps some. She stated she does not want to completely stop as she feels that she has had so many great memories throughout her life while having drinks with loved ones. She did not want to commit to signing up for any alcohol programs as she feels that refraining from alcohol for a month will be helpful for the time being. 'er offered some different alcohol and counseling addiction resources and pt was agreeable to take them for future.     Plan: Nothing further. Provided pt with multiple substance abuse cessation resources in the community.

## 2017-04-21 NOTE — THERAPY
"Occupational Therapy Evaluation completed.   Functional Status:  Min A with ADLs and Min A with mobility with AE.   Plan of Care: Will benefit from Occupational Therapy 2 times per week  Discharge Recommendations:  Equipment: Will Continue to Assess for Equipment Needs. Post-acute therapy Discharge to a transitional care facility for continued skilled therapy services.    Patient presents s/p fall at home injurying R eye and requiring patch. Patient reports h/o of falls and verbalize preference to go to rehab prior to home 2/2 desire to address deficits prior to assuming caregiver responsibilities full time again. Dtr and spouse agree with patient preference. PTA, patient I with ADLs, IADLs, and mobility. Upon eval, patient presents with decreased balance, endurance, tolerance, and ADL participation, and visual field cuts necessitating Min A with ADLs and Min A with mobility with AE. Patient would benefit from skilled OT in this setting followed by rehab to [facility] prior to DC home with services. x2    See \"Rehab Therapy-Acute\" Patient Summary Report for complete documentation.    "

## 2017-04-21 NOTE — OR NURSING
PT WITH CONFUSION ANESTHESIA PROVIDER CALLED. ISSUE ADDRESSED. WITHIN EXPECTED FINDINGS FOR NOW. NO NEW ORDERS CURRENTLY. WILL MONITOR.

## 2017-04-21 NOTE — OR NURSING
PT REMAINS W/O PAIN . PT'S MEMORY IMPROVING. MEETS D/C CRITERIA REPORT TO RN -1 TRANSPORT VIA BED. W/ FAMILY.

## 2017-04-22 LAB
ANION GAP SERPL CALC-SCNC: 7 MMOL/L (ref 0–11.9)
ANISOCYTOSIS BLD QL SMEAR: ABNORMAL
BASOPHILS # BLD AUTO: 0 % (ref 0–1.8)
BASOPHILS # BLD: 0 K/UL (ref 0–0.12)
BUN SERPL-MCNC: 13 MG/DL (ref 8–22)
CALCIUM SERPL-MCNC: 7.5 MG/DL (ref 8.5–10.5)
CHLORIDE SERPL-SCNC: 107 MMOL/L (ref 96–112)
CO2 SERPL-SCNC: 25 MMOL/L (ref 20–33)
CREAT SERPL-MCNC: 0.63 MG/DL (ref 0.5–1.4)
EOSINOPHIL # BLD AUTO: 0 K/UL (ref 0–0.51)
EOSINOPHIL NFR BLD: 0 % (ref 0–6.9)
ERYTHROCYTE [DISTWIDTH] IN BLOOD BY AUTOMATED COUNT: 69 FL (ref 35.9–50)
GFR SERPL CREATININE-BSD FRML MDRD: >60 ML/MIN/1.73 M 2
GLUCOSE SERPL-MCNC: 104 MG/DL (ref 65–99)
HCT VFR BLD AUTO: 27.7 % (ref 37–47)
HGB BLD-MCNC: 8.8 G/DL (ref 12–16)
LYMPHOCYTES # BLD AUTO: 0.52 K/UL (ref 1–4.8)
LYMPHOCYTES NFR BLD: 8.6 % (ref 22–41)
MANUAL DIFF BLD: NORMAL
MCH RBC QN AUTO: 32.7 PG (ref 27–33)
MCHC RBC AUTO-ENTMCNC: 31.5 G/DL (ref 33.6–35)
MCV RBC AUTO: 103.7 FL (ref 81.4–97.8)
MICROCYTES BLD QL SMEAR: ABNORMAL
MONOCYTES # BLD AUTO: 0.46 K/UL (ref 0–0.85)
MONOCYTES NFR BLD AUTO: 7.6 % (ref 0–13.4)
MORPHOLOGY BLD-IMP: NORMAL
NEUTROPHILS # BLD AUTO: 5.03 K/UL (ref 2–7.15)
NEUTROPHILS NFR BLD: 83.8 % (ref 44–72)
NRBC # BLD AUTO: 0 K/UL
NRBC BLD AUTO-RTO: 0 /100 WBC
PLATELET # BLD AUTO: 103 K/UL (ref 164–446)
PLATELET BLD QL SMEAR: NORMAL
PMV BLD AUTO: 10.6 FL (ref 9–12.9)
POLYCHROMASIA BLD QL SMEAR: NORMAL
POTASSIUM SERPL-SCNC: 3.8 MMOL/L (ref 3.6–5.5)
RBC # BLD AUTO: 2.69 M/UL (ref 4.2–5.4)
RBC BLD AUTO: PRESENT
SODIUM SERPL-SCNC: 139 MMOL/L (ref 135–145)
WBC # BLD AUTO: 6 K/UL (ref 4.8–10.8)

## 2017-04-22 PROCEDURE — 700111 HCHG RX REV CODE 636 W/ 250 OVERRIDE (IP): Performed by: NURSE PRACTITIONER

## 2017-04-22 PROCEDURE — 770006 HCHG ROOM/CARE - MED/SURG/GYN SEMI*

## 2017-04-22 PROCEDURE — 700102 HCHG RX REV CODE 250 W/ 637 OVERRIDE(OP): Performed by: OPHTHALMOLOGY

## 2017-04-22 PROCEDURE — 85007 BL SMEAR W/DIFF WBC COUNT: CPT

## 2017-04-22 PROCEDURE — A9270 NON-COVERED ITEM OR SERVICE: HCPCS | Performed by: NURSE PRACTITIONER

## 2017-04-22 PROCEDURE — 80048 BASIC METABOLIC PNL TOTAL CA: CPT

## 2017-04-22 PROCEDURE — 700112 HCHG RX REV CODE 229: Performed by: SURGERY

## 2017-04-22 PROCEDURE — A9270 NON-COVERED ITEM OR SERVICE: HCPCS | Performed by: OPHTHALMOLOGY

## 2017-04-22 PROCEDURE — 85027 COMPLETE CBC AUTOMATED: CPT

## 2017-04-22 PROCEDURE — 700102 HCHG RX REV CODE 250 W/ 637 OVERRIDE(OP): Performed by: NURSE PRACTITIONER

## 2017-04-22 PROCEDURE — 700102 HCHG RX REV CODE 250 W/ 637 OVERRIDE(OP): Performed by: SURGERY

## 2017-04-22 PROCEDURE — A9270 NON-COVERED ITEM OR SERVICE: HCPCS | Performed by: SURGERY

## 2017-04-22 PROCEDURE — 36415 COLL VENOUS BLD VENIPUNCTURE: CPT

## 2017-04-22 RX ADMIN — LISINOPRIL 20 MG: 20 TABLET ORAL at 08:19

## 2017-04-22 RX ADMIN — MAGNESIUM HYDROXIDE 30 ML: 400 SUSPENSION ORAL at 08:17

## 2017-04-22 RX ADMIN — GABAPENTIN 300 MG: 300 CAPSULE ORAL at 15:56

## 2017-04-22 RX ADMIN — LORAZEPAM 1 MG: 1 TABLET ORAL at 08:47

## 2017-04-22 RX ADMIN — DOCUSATE SODIUM 100 MG: 100 CAPSULE ORAL at 08:20

## 2017-04-22 RX ADMIN — LORAZEPAM 1 MG: 1 TABLET ORAL at 15:55

## 2017-04-22 RX ADMIN — GABAPENTIN 300 MG: 300 CAPSULE ORAL at 08:19

## 2017-04-22 RX ADMIN — LORAZEPAM 1 MG: 1 TABLET ORAL at 21:51

## 2017-04-22 RX ADMIN — OXYCODONE HYDROCHLORIDE 10 MG: 5 TABLET ORAL at 08:20

## 2017-04-22 RX ADMIN — OXYCODONE HYDROCHLORIDE 10 MG: 5 TABLET ORAL at 01:19

## 2017-04-22 RX ADMIN — SODIUM CHLORIDE, POTASSIUM CHLORIDE, SODIUM LACTATE AND CALCIUM CHLORIDE: 600; 310; 30; 20 INJECTION, SOLUTION INTRAVENOUS at 21:51

## 2017-04-22 RX ADMIN — METOPROLOL SUCCINATE 50 MG: 50 TABLET, EXTENDED RELEASE ORAL at 08:19

## 2017-04-22 RX ADMIN — STANDARDIZED SENNA CONCENTRATE AND DOCUSATE SODIUM 1 TABLET: 8.6; 5 TABLET, FILM COATED ORAL at 21:51

## 2017-04-22 RX ADMIN — DOCUSATE SODIUM 100 MG: 100 CAPSULE ORAL at 21:51

## 2017-04-22 RX ADMIN — LEVOTHYROXINE SODIUM 50 MCG: 50 TABLET ORAL at 05:50

## 2017-04-22 RX ADMIN — OXYCODONE HYDROCHLORIDE 5 MG: 5 TABLET ORAL at 15:54

## 2017-04-22 RX ADMIN — GABAPENTIN 300 MG: 300 CAPSULE ORAL at 21:51

## 2017-04-22 RX ADMIN — OXYCODONE HYDROCHLORIDE 5 MG: 5 TABLET ORAL at 21:51

## 2017-04-22 RX ADMIN — LEVOFLOXACIN 750 MG: 500 TABLET, FILM COATED ORAL at 08:18

## 2017-04-22 RX ADMIN — POLYETHYLENE GLYCOL 3350 1 PACKET: 17 POWDER, FOR SOLUTION ORAL at 08:17

## 2017-04-22 ASSESSMENT — LIFESTYLE VARIABLES
NAUSEA AND VOMITING: NO NAUSEA AND NO VOMITING
TOTAL SCORE: 9
TOTAL SCORE: 9
DOES PATIENT WANT TO STOP DRINKING: NO
ANXIETY: NO ANXIETY (AT EASE)
ANXIETY: MILDLY ANXIOUS
NAUSEA AND VOMITING: NO NAUSEA AND NO VOMITING
ORIENTATION AND CLOUDING OF SENSORIUM: ORIENTED AND CAN DO SERIAL ADDITIONS
NAUSEA AND VOMITING: NO NAUSEA AND NO VOMITING
EVER FELT BAD OR GUILTY ABOUT YOUR DRINKING: NO
AGITATION: SOMEWHAT MORE THAN NORMAL ACTIVITY
HEADACHE, FULLNESS IN HEAD: MODERATE
TOTAL SCORE: 0
TOTAL SCORE: 0
ORIENTATION AND CLOUDING OF SENSORIUM: ORIENTED AND CAN DO SERIAL ADDITIONS
AUDITORY DISTURBANCES: VERY MILD HARSHNESS OR ABILITY TO FRIGHTEN
AUDITORY DISTURBANCES: VERY MILD HARSHNESS OR ABILITY TO FRIGHTEN
PAROXYSMAL SWEATS: NO SWEAT VISIBLE
HAVE YOU EVER FELT YOU SHOULD CUT DOWN ON YOUR DRINKING: NO
VISUAL DISTURBANCES: NOT PRESENT
AUDITORY DISTURBANCES: NOT PRESENT
TREMOR: *
VISUAL DISTURBANCES: VERY MILD SENSITIVITY
PAROXYSMAL SWEATS: NO SWEAT VISIBLE
AVERAGE NUMBER OF DAYS PER WEEK YOU HAVE A DRINK CONTAINING ALCOHOL: 7
NAUSEA AND VOMITING: NO NAUSEA AND NO VOMITING
ON A TYPICAL DAY WHEN YOU DRINK ALCOHOL HOW MANY DRINKS DO YOU HAVE: 2
AGITATION: NORMAL ACTIVITY
TREMOR: TREMOR NOT VISIBLE BUT CAN BE FELT, FINGERTIP TO FINGERTIP
HEADACHE, FULLNESS IN HEAD: MODERATELY SEVERE
AGITATION: NORMAL ACTIVITY
AGITATION: NORMAL ACTIVITY
TREMOR: *
VISUAL DISTURBANCES: VERY MILD SENSITIVITY
DO YOU DRINK ALCOHOL: YES
TOTAL SCORE: 1
HEADACHE, FULLNESS IN HEAD: MODERATE
TREMOR: *
ORIENTATION AND CLOUDING OF SENSORIUM: ORIENTED AND CAN DO SERIAL ADDITIONS
HEADACHE, FULLNESS IN HEAD: NOT PRESENT
EVER HAD A DRINK FIRST THING IN THE MORNING TO STEADY YOUR NERVES TO GET RID OF A HANGOVER: NO
CONSUMPTION TOTAL: POSITIVE
ANXIETY: MILDLY ANXIOUS
ANXIETY: MILDLY ANXIOUS
TACTILE DISTURBANCES: VERY MILD ITCHING, PINS AND NEEDLES SENSATION, BURNING OR NUMBNESS
TACTILE DISTURBANCES: VERY MILD ITCHING, PINS AND NEEDLES SENSATION, BURNING OR NUMBNESS
PAROXYSMAL SWEATS: NO SWEAT VISIBLE
HAVE PEOPLE ANNOYED YOU BY CRITICIZING YOUR DRINKING: NO
HOW MANY TIMES IN THE PAST YEAR HAVE YOU HAD 5 OR MORE DRINKS IN A DAY: 0
ORIENTATION AND CLOUDING OF SENSORIUM: ORIENTED AND CAN DO SERIAL ADDITIONS
TOTAL SCORE: 0
AUDITORY DISTURBANCES: VERY MILD HARSHNESS OR ABILITY TO FRIGHTEN
VISUAL DISTURBANCES: NOT PRESENT
TOTAL SCORE: 9
PAROXYSMAL SWEATS: NO SWEAT VISIBLE

## 2017-04-22 ASSESSMENT — ENCOUNTER SYMPTOMS
MYALGIAS: 1
NAUSEA: 0
FEVER: 0
TREMORS: 0
SHORTNESS OF BREATH: 0
ABDOMINAL PAIN: 0
FOCAL WEAKNESS: 0
SPEECH CHANGE: 0
INSOMNIA: 1
CHILLS: 0
HEADACHES: 0
VOMITING: 0
EYE PAIN: 1

## 2017-04-22 ASSESSMENT — PAIN SCALES - GENERAL
PAINLEVEL_OUTOF10: 3
PAINLEVEL_OUTOF10: 5
PAINLEVEL_OUTOF10: 3
PAINLEVEL_OUTOF10: 5
PAINLEVEL_OUTOF10: 8

## 2017-04-22 ASSESSMENT — PATIENT HEALTH QUESTIONNAIRE - PHQ9
SUM OF ALL RESPONSES TO PHQ QUESTIONS 1-9: 0
1. LITTLE INTEREST OR PLEASURE IN DOING THINGS: NOT AT ALL
SUM OF ALL RESPONSES TO PHQ9 QUESTIONS 1 AND 2: 0
2. FEELING DOWN, DEPRESSED, IRRITABLE, OR HOPELESS: NOT AT ALL

## 2017-04-22 NOTE — PROGRESS NOTES
"  Trauma/Surgical Progress Note    Author: Marcos Parsons Date & Time created: 4/22/2017   2:07 PM     Interval Events:    Cleared by opthalmology for discharge.  Rehab denied, arrange home health.  Counseled     Review of Systems   Constitutional: Negative for fever and chills.   HENT:        Facial fracture   Eyes: Positive for pain.        Baseline vision left eye  Right eye pain    Respiratory: Negative for shortness of breath.    Cardiovascular: Negative for chest pain.   Gastrointestinal: Negative for nausea, vomiting and abdominal pain.   Genitourinary: Negative for dysuria.   Musculoskeletal: Positive for myalgias.   Skin:        Bilateral knee bruising. Per patient from previous fall.   Neurological: Negative for tremors, speech change, focal weakness and headaches.   Psychiatric/Behavioral: The patient has insomnia.      Hemodynamics:  Blood pressure 147/74, pulse 98, temperature 37.7 °C (99.9 °F), resp. rate 18, height 1.753 m (5' 9.02\"), weight 63.3 kg (139 lb 8.8 oz), SpO2 100 %, not currently breastfeeding.     Respiratory:    Respiration: 18, Pulse Oximetry: 100 %        RUL Breath Sounds: Clear, RML Breath Sounds: Clear, RLL Breath Sounds: Diminished, STEVEN Breath Sounds: Clear, LLL Breath Sounds: Diminished  Fluids:    Intake/Output Summary (Last 24 hours) at 04/22/17 1407  Last data filed at 04/22/17 0400   Gross per 24 hour   Intake    550 ml   Output    600 ml   Net    -50 ml     Admit Weight: 70.308 kg (155 lb) (estimated)  Current      Physical Exam   Constitutional: She is oriented to person, place, and time. No distress.   HENT:   Facial sutures intact   Eyes:   Right eye patch   Bilateral periorbital ecchymosis    Neck: No JVD present.   Cardiovascular: Normal rate and regular rhythm.    Pulmonary/Chest: No respiratory distress. She exhibits no tenderness.   Abdominal: She exhibits no distension. There is no tenderness.   Musculoskeletal: Normal range of motion.   Neurological: She is alert and " oriented to person, place, and time.   Skin: Skin is warm and dry.   Bilateral knee ecchymosis    Nursing note and vitals reviewed.      Medical Decision Making/Problem List:    Active Hospital Problems    Diagnosis   • Ruptured globe of right eye [S05.31XA]     Priority: High     Rupture of right ocular globe with associated hemorrhage both intraocular and retrobulbar, with associated proptosis  4/20 - Repair of scleral alceration right eye  Richard Talbert MD. Opthalmology        • Facial fracture (CMS-HCC) [S02.92XA]     Priority: Medium     Right inferior orbital wall fracture with inferior herniation of intraorbital fat  Will likely require operative repair in a couple of weeks  Tucker Beth MD. Plastic surgery       • Trauma [T14.90]     Priority: Low     Fall against object.   CT head and cervical spine negative.      • Inadequate anticoagulation [Z51.81, Z79.01]     Priority: Low     RAP score 4  4/21 - Initiate lovenox when cleared by opthalmology.   Lower extremity sonogram as clinically indicated.        Core Measures & Quality Metrics:  Labs reviewed and Medications reviewed  Dumont catheter: No Dumont      DVT Prophylaxis: Contraindicated - High bleeding risk and Not indicated at this time, ambulatory  DVT prophylaxis - mechanical: SCDs  Ulcer prophylaxis: Not indicated  Antibiotics: Treating active infection/contamination beyond 24 hours perioperative coverage  Assessed for rehab: Patient returned to prior level of function, rehabilitation not indicated at this time    Total Score: 4    Discussed patient condition with RN, Patient and trauma surgery, Dr.Alvaro Calderon      Seen on rounds  Working on arrangements for safe discharge  Discussed with Marcos Calderon MD

## 2017-04-22 NOTE — PROGRESS NOTES
"Pt c/o HA pain given Oxy 10mg PO as well as ice packs. Pt c/o \"insects buzzing in ears\" but states she has had this before.  Pt has tremors in hands and tongue, denies nausea.  BS+, appetite good. Pt sitting at side of bed eating breakfast, ice pack on back of neck for comfort per pt request.    "

## 2017-04-22 NOTE — FACE TO FACE
Face to Face Supporting Documentation - Home Health    The encounter with this patient was in whole or in part the primary reason for home health admission.    Date of encounter:   Patient:                    MRN:                       YOB: 2017  Shyla Ochoa  [id number]  1940     Home health to see patient for:  Skilled Nursing care for assessment, interventions & education, Physical Therapy evaluation and treatment, Wound Care and Occupational therapy evaluation and treatment    Skilled need for:  Surgical Aftercare Right eye surgery, Recent Deterioration of Health Status multiple falls and Medication Management multiple medications    Skilled nursing interventions to include:  Wound Care and Comment: physical and occupational therapy    Homebound status evidenced by:  Require the use of special transportation or Needs the assistance of another person in order to leave the home. Leaving home requires a considerable and taxing effort. There is a normal inability to leave the home.    Community Physician to provide follow up care: ADITYA Joel     Optional Interventions? No      I certify the face to face encounter for this home health care referral meets the CMS requirements and the encounter/clinical assessment with the patient was, in whole, or in part, for the medical condition(s) listed above, which is the primary reason for home health care. Based on my clinical findings: the service(s) are medically necessary, support the need for home health care, and the homebound criteria are met.  I certify that this patient has had a face to face encounter by myself.  CLAUDE De Oliveira. - NPI: [id number]

## 2017-04-22 NOTE — PROGRESS NOTES
Report received from SHY Muñoz and assumed care of pt at 1900. A&0x4, VSS on 1L o2 per NC, tolerating regular diet, up to bathroom with standby assist, IVF infusing to PIV, left knee swollen from previous fall per pt, POC discussed, call light within reach, hourly rounding in place.

## 2017-04-22 NOTE — PROGRESS NOTES
Pt's son called and requested that a copy of pt's chest xray be given to pt so she can provide it to son for possible placement in assisted living facility upon DC from hospital or DC from rehab/SNF.  Pt's son's name is Pino Ochoa MD from [place]
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
## 2017-04-22 NOTE — PROGRESS NOTES
"Pt is sitting at side of bed most of the afternoon, amb x 1-2 w/FWW in hallway.  Pt's face is eccymotic, but edema is getting better.  Pt participated in physical therapy, swelling noted in bilat knees, left knee edema greater than right.  Pt reports she has fallen at home several times and \"hurt my knee\".  Pt also reports she has BLE/foot neuropathy \"unless I take my gabepentin.\"  Pt also reports that she will \"quit drinking for awhile, I really did it good this time.\"  POC is for pt to possibly go to a rehab unit, get stronger and steadier with ambulation.  Pt states she will request a \"sleeping pill\" to help her sleep this evening.  Pt does have been alarm in place.    "
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
## 2017-04-22 NOTE — PROGRESS NOTES
"Pt fidgety, all over the bed.  CIWA is 9 and pt given 1mg PO Ativan as well as Oxy 5mg.  Pt has HA pain in the posterior area, hands and tongue are trembling.  Pt c/o \"ringing in ears\" and states her \"eyes are really sensitive today in the light\".  Pt c/o \"leaking\" from right eye.  Patch undone and outer right eye dabbed w/sterile gauze for minimal amount of bloody drainage.  Patch reapplied and tape reinforced.  Educated pt on the CIWA scale, s/s of withdrawal and the need to stop \"flopping around\" so much.  Pt verbalized understanding.  Pt has call bell w/in reach, changed bedding as well as clean gown.  Bed alarm is in place  "

## 2017-04-23 PROCEDURE — 700102 HCHG RX REV CODE 250 W/ 637 OVERRIDE(OP): Performed by: SURGERY

## 2017-04-23 PROCEDURE — A9270 NON-COVERED ITEM OR SERVICE: HCPCS | Performed by: OPHTHALMOLOGY

## 2017-04-23 PROCEDURE — 700102 HCHG RX REV CODE 250 W/ 637 OVERRIDE(OP): Performed by: NURSE PRACTITIONER

## 2017-04-23 PROCEDURE — 700102 HCHG RX REV CODE 250 W/ 637 OVERRIDE(OP): Performed by: OPHTHALMOLOGY

## 2017-04-23 PROCEDURE — A9270 NON-COVERED ITEM OR SERVICE: HCPCS | Performed by: SURGERY

## 2017-04-23 PROCEDURE — 700101 HCHG RX REV CODE 250: Performed by: NURSE PRACTITIONER

## 2017-04-23 PROCEDURE — 700112 HCHG RX REV CODE 229: Performed by: SURGERY

## 2017-04-23 PROCEDURE — 770006 HCHG ROOM/CARE - MED/SURG/GYN SEMI*

## 2017-04-23 PROCEDURE — A9270 NON-COVERED ITEM OR SERVICE: HCPCS | Performed by: NURSE PRACTITIONER

## 2017-04-23 RX ORDER — MOXIFLOXACIN 5 MG/ML
1 SOLUTION/ DROPS OPHTHALMIC 4 TIMES DAILY
Status: DISCONTINUED | OUTPATIENT
Start: 2017-04-23 | End: 2017-04-24 | Stop reason: HOSPADM

## 2017-04-23 RX ORDER — POLYVINYL ALCOHOL 14 MG/ML
1 SOLUTION/ DROPS OPHTHALMIC
Status: DISCONTINUED | OUTPATIENT
Start: 2017-04-23 | End: 2017-04-24 | Stop reason: HOSPADM

## 2017-04-23 RX ORDER — PREDNISOLONE ACETATE 10 MG/ML
1 SUSPENSION/ DROPS OPHTHALMIC 4 TIMES DAILY
Status: DISCONTINUED | OUTPATIENT
Start: 2017-04-23 | End: 2017-04-24 | Stop reason: HOSPADM

## 2017-04-23 RX ORDER — PREDNISOLONE ACETATE 10 MG/ML
1 SUSPENSION/ DROPS OPHTHALMIC 4 TIMES DAILY
Status: DISCONTINUED | OUTPATIENT
Start: 2017-04-23 | End: 2017-04-23

## 2017-04-23 RX ADMIN — LISINOPRIL 20 MG: 20 TABLET ORAL at 08:43

## 2017-04-23 RX ADMIN — LORAZEPAM 0.5 MG: 1 TABLET ORAL at 20:33

## 2017-04-23 RX ADMIN — OXYCODONE HYDROCHLORIDE 5 MG: 5 TABLET ORAL at 20:32

## 2017-04-23 RX ADMIN — STANDARDIZED SENNA CONCENTRATE AND DOCUSATE SODIUM 1 TABLET: 8.6; 5 TABLET, FILM COATED ORAL at 20:32

## 2017-04-23 RX ADMIN — LORAZEPAM 0.5 MG: 1 TABLET ORAL at 08:47

## 2017-04-23 RX ADMIN — TOBRAMYCIN AND DEXAMETHASONE: 3; 1 OINTMENT OPHTHALMIC at 13:54

## 2017-04-23 RX ADMIN — MOXIFLOXACIN HYDROCHLORIDE 1 DROP: 5 SOLUTION/ DROPS OPHTHALMIC at 13:21

## 2017-04-23 RX ADMIN — METOPROLOL SUCCINATE 50 MG: 50 TABLET, EXTENDED RELEASE ORAL at 08:43

## 2017-04-23 RX ADMIN — POLYVINYL ALCOHOL 1 DROP: 14 SOLUTION/ DROPS OPHTHALMIC at 20:38

## 2017-04-23 RX ADMIN — MOXIFLOXACIN HYDROCHLORIDE 1 DROP: 5 SOLUTION/ DROPS OPHTHALMIC at 20:41

## 2017-04-23 RX ADMIN — MOXIFLOXACIN HYDROCHLORIDE 1 DROP: 5 SOLUTION/ DROPS OPHTHALMIC at 17:12

## 2017-04-23 RX ADMIN — POLYVINYL ALCOHOL 1 DROP: 14 SOLUTION/ DROPS OPHTHALMIC at 13:54

## 2017-04-23 RX ADMIN — POLYVINYL ALCOHOL 1 DROP: 14 SOLUTION/ DROPS OPHTHALMIC at 22:06

## 2017-04-23 RX ADMIN — DOCUSATE SODIUM 100 MG: 100 CAPSULE ORAL at 08:43

## 2017-04-23 RX ADMIN — GABAPENTIN 300 MG: 300 CAPSULE ORAL at 15:55

## 2017-04-23 RX ADMIN — PREDNISOLONE ACETATE 1 DROP: 10 SUSPENSION/ DROPS OPHTHALMIC at 17:11

## 2017-04-23 RX ADMIN — LEVOFLOXACIN 750 MG: 500 TABLET, FILM COATED ORAL at 08:43

## 2017-04-23 RX ADMIN — GABAPENTIN 300 MG: 300 CAPSULE ORAL at 20:32

## 2017-04-23 RX ADMIN — POLYVINYL ALCOHOL 1 DROP: 14 SOLUTION/ DROPS OPHTHALMIC at 17:12

## 2017-04-23 RX ADMIN — TOBRAMYCIN AND DEXAMETHASONE: 3; 1 OINTMENT OPHTHALMIC at 17:12

## 2017-04-23 RX ADMIN — TOBRAMYCIN AND DEXAMETHASONE: 3; 1 OINTMENT OPHTHALMIC at 20:43

## 2017-04-23 RX ADMIN — PREDNISOLONE ACETATE 1 DROP: 10 SUSPENSION/ DROPS OPHTHALMIC at 20:40

## 2017-04-23 RX ADMIN — POLYETHYLENE GLYCOL 3350 1 PACKET: 17 POWDER, FOR SOLUTION ORAL at 20:32

## 2017-04-23 RX ADMIN — DOCUSATE SODIUM 100 MG: 100 CAPSULE ORAL at 20:32

## 2017-04-23 RX ADMIN — LEVOTHYROXINE SODIUM 50 MCG: 50 TABLET ORAL at 05:51

## 2017-04-23 RX ADMIN — GABAPENTIN 300 MG: 300 CAPSULE ORAL at 08:43

## 2017-04-23 ASSESSMENT — LIFESTYLE VARIABLES
ORIENTATION AND CLOUDING OF SENSORIUM: ORIENTED AND CAN DO SERIAL ADDITIONS
PAROXYSMAL SWEATS: NO SWEAT VISIBLE
AUDITORY DISTURBANCES: VERY MILD HARSHNESS OR ABILITY TO FRIGHTEN
TREMOR: *
TREMOR: *
TREMOR: TREMOR NOT VISIBLE BUT CAN BE FELT, FINGERTIP TO FINGERTIP
VISUAL DISTURBANCES: VERY MILD SENSITIVITY
VISUAL DISTURBANCES: VERY MILD SENSITIVITY
ORIENTATION AND CLOUDING OF SENSORIUM: ORIENTED AND CAN DO SERIAL ADDITIONS
HEADACHE, FULLNESS IN HEAD: VERY MILD
NAUSEA AND VOMITING: NO NAUSEA AND NO VOMITING
HEADACHE, FULLNESS IN HEAD: MILD
TOTAL SCORE: 5
AUDITORY DISTURBANCES: VERY MILD HARSHNESS OR ABILITY TO FRIGHTEN
VISUAL DISTURBANCES: VERY MILD SENSITIVITY
TACTILE DISTURBANCES: VERY MILD ITCHING, PINS AND NEEDLES SENSATION, BURNING OR NUMBNESS
PAROXYSMAL SWEATS: NO SWEAT VISIBLE
ANXIETY: MILDLY ANXIOUS
TACTILE DISTURBANCES: VERY MILD ITCHING, PINS AND NEEDLES SENSATION, BURNING OR NUMBNESS
AGITATION: NORMAL ACTIVITY
ORIENTATION AND CLOUDING OF SENSORIUM: ORIENTED AND CAN DO SERIAL ADDITIONS
TOTAL SCORE: 8
VISUAL DISTURBANCES: VERY MILD SENSITIVITY
NAUSEA AND VOMITING: NO NAUSEA AND NO VOMITING
TREMOR: TREMOR NOT VISIBLE BUT CAN BE FELT, FINGERTIP TO FINGERTIP
AGITATION: NORMAL ACTIVITY
NAUSEA AND VOMITING: NO NAUSEA AND NO VOMITING
AGITATION: NORMAL ACTIVITY
ANXIETY: MILDLY ANXIOUS
ANXIETY: MILDLY ANXIOUS
TOTAL SCORE: 5
AGITATION: NORMAL ACTIVITY
ANXIETY: MILDLY ANXIOUS
NAUSEA AND VOMITING: NO NAUSEA AND NO VOMITING
AUDITORY DISTURBANCES: VERY MILD HARSHNESS OR ABILITY TO FRIGHTEN
ORIENTATION AND CLOUDING OF SENSORIUM: ORIENTED AND CAN DO SERIAL ADDITIONS
HEADACHE, FULLNESS IN HEAD: MILD
PAROXYSMAL SWEATS: NO SWEAT VISIBLE
AUDITORY DISTURBANCES: VERY MILD HARSHNESS OR ABILITY TO FRIGHTEN
HEADACHE, FULLNESS IN HEAD: VERY MILD
TOTAL SCORE: 8
PAROXYSMAL SWEATS: NO SWEAT VISIBLE

## 2017-04-23 ASSESSMENT — ENCOUNTER SYMPTOMS
MYALGIAS: 1
FEVER: 0
ABDOMINAL PAIN: 0
HEADACHES: 0
SPEECH CHANGE: 0
VOMITING: 0
TREMORS: 0
FOCAL WEAKNESS: 0
CHILLS: 0
SHORTNESS OF BREATH: 0
NAUSEA: 0
EYE PAIN: 1

## 2017-04-23 ASSESSMENT — PAIN SCALES - GENERAL
PAINLEVEL_OUTOF10: 3
PAINLEVEL_OUTOF10: 3

## 2017-04-23 NOTE — PROGRESS NOTES
Bedside report received.  Assessment complete.  A&O x 4. Patient calls appropriately.  Patient up with 1 assist and FWW. Bed alarm in place.   Patient has 3/10 pain. MAR medication given  Denies N&V. Tolerating diet.  + void, + flatus  Right eye shield in place.   Review plan with of care with patient. Call light and personal belongings with in reach. Hourly rounding in place. All needs met at this time.

## 2017-04-23 NOTE — PROGRESS NOTES
"Pt AA&Ox4. Pain rating at 3. CIWA at 5 this am. Medicated per CIWA protocol. Right eye shield in place. Pt states no double vision through the left eye. Sutures malia above right eye. No signs of infection noted. Pt sitting in  Chair for breakfast. Chair alarm on. Call light within reach. Plan of care discussed. Pt states having a BM this am. Blood pressure 112/72, pulse 98, temperature 36.9 °C (98.4 °F), resp. rate 16, height 1.753 m (5' 9.02\"), weight 63.3 kg (139 lb 8.8 oz), SpO2 96 %, not currently breastfeeding.    "

## 2017-04-23 NOTE — CARE PLAN
Problem: Safety  Goal: Will remain free from falls  Intervention: Assess risk factors for falls  Pt up with standby assist and FWW. Call light within reach. Bed alarm on.       Problem: Knowledge Deficit  Goal: Knowledge of disease process/condition, treatment plan, diagnostic tests, and medications will improve  Pt instructed on pain management, ambulating    Problem: Pain Management  Goal: Pain level will decrease to patient’s comfort goal  Pain controlled with po pain meds    Problem: Psychosocial Needs:  Goal: Level of anxiety will decrease  Pt encouraged to ask questions and verbalize concerns

## 2017-04-23 NOTE — CARE PLAN
Problem: Safety  Goal: Will remain free from injury  Call light within reach. Patient calls appropratiely.

## 2017-04-23 NOTE — PROGRESS NOTES
"  Trauma/Surgical Progress Note    Author: Marcos Parsons Date & Time created: 4/23/2017   10:17 AM     Interval Events:    No critical events overnight  Ambulating in [place] with walker   Disposition: [facility] denied. Skilled referral at patient request. Prefers [facility].  Counseled     Review of Systems   Constitutional: Negative for fever and chills.   HENT:        Facial fracture   Eyes: Positive for pain.        Baseline vision left eye  Right eye pain    Respiratory: Negative for shortness of breath.    Cardiovascular: Negative for chest pain.   Gastrointestinal: Negative for nausea, vomiting and abdominal pain.        4/23 BM   Genitourinary: Negative for dysuria.   Musculoskeletal: Positive for myalgias.   Skin:        Bilateral knee bruising. Per patient from previous fall.   Neurological: Negative for tremors, speech change, focal weakness and headaches.     Hemodynamics:  Blood pressure 151/84, pulse 89, temperature 37.1 °C (98.7 °F), resp. rate 16, height 1.753 m (5' 9.02\"), weight 63.3 kg (139 lb 8.8 oz), SpO2 94 %, not currently breastfeeding.     Respiratory:    Respiration: 16, Pulse Oximetry: 94 %        RUL Breath Sounds: Clear, RML Breath Sounds: Clear, RLL Breath Sounds: Diminished, STEVEN Breath Sounds: Clear, LLL Breath Sounds: Diminished  Fluids:    Intake/Output Summary (Last 24 hours) at 04/23/17 1017  Last data filed at 04/23/17 0900   Gross per 24 hour   Intake   1320 ml   Output    800 ml   Net    520 ml     Admit Weight: 70.308 kg (155 lb) (estimated)  Current      Physical Exam   Constitutional: She is oriented to person, place, and time. No distress.   HENT:   Facial sutures intact   Eyes:   Right eye patch   Bilateral periorbital ecchymosis    Neck: No JVD present.   Cardiovascular: Normal rate and regular rhythm.    Pulmonary/Chest: No respiratory distress. She exhibits no tenderness.   Abdominal: She exhibits no distension. There is no tenderness.   Musculoskeletal: Normal range of " motion.   Neurological: She is alert and oriented to person, place, and time.   Skin: Skin is warm and dry.   Bilateral knee ecchymosis    Nursing note and vitals reviewed.      Medical Decision Making/Problem List:    Active Hospital Problems    Diagnosis   • Ruptured globe of right eye [S05.31XA]     Priority: High     Rupture of right ocular globe with associated hemorrhage both intraocular and retrobulbar, with associated proptosis  4/20 - Repair of scleral alceration right eye  Richard Talbert MD. Opthalmology         • Facial fracture (CMS-HCC) [S02.92XA]     Priority: Medium     Right inferior orbital wall fracture with inferior herniation of intraorbital fat  Will likely require operative repair in a couple of weeks  Tucker Beth MD. Plastic surgery        • Trauma [T14.90]     Priority: Low     Fall against object.   CT head and cervical spine negative.      • Inadequate anticoagulation [Z51.81, Z79.01]     Priority: Low     RAP score 4  Ambulatory   Lower extremity sonogram as clinically indicated.        Core Measures & Quality Metrics:  Labs reviewed and Medications reviewed  Dumont catheter: No Dumont      DVT Prophylaxis: Not indicated at this time, ambulatory  DVT prophylaxis - mechanical: SCDs  Ulcer prophylaxis: Not indicated  Antibiotics: Treating active infection/contamination beyond 24 hours perioperative coverage  Assessed for rehab: Patient returned to prior level of function, rehabilitation not indicated at this time    Total Score: 4    Discussed patient condition with RN, Patient and trauma surgery, Dr. Zay Calderon      Seen on rounds  Will add opthamology meds  Awaiting skillled bed  Discussed with patient, opthamology, and Marcos Calderon MD

## 2017-04-24 VITALS
BODY MASS INDEX: 20.67 KG/M2 | DIASTOLIC BLOOD PRESSURE: 88 MMHG | HEART RATE: 95 BPM | RESPIRATION RATE: 18 BRPM | WEIGHT: 139.55 LBS | TEMPERATURE: 98.5 F | OXYGEN SATURATION: 96 % | HEIGHT: 69 IN | SYSTOLIC BLOOD PRESSURE: 137 MMHG

## 2017-04-24 LAB
MAGNESIUM SERPL-MCNC: 1.8 MG/DL (ref 1.5–2.5)
PHOSPHATE SERPL-MCNC: 2.7 MG/DL (ref 2.5–4.5)

## 2017-04-24 PROCEDURE — 700102 HCHG RX REV CODE 250 W/ 637 OVERRIDE(OP): Performed by: NURSE PRACTITIONER

## 2017-04-24 PROCEDURE — 36415 COLL VENOUS BLD VENIPUNCTURE: CPT

## 2017-04-24 PROCEDURE — A9270 NON-COVERED ITEM OR SERVICE: HCPCS | Performed by: OPHTHALMOLOGY

## 2017-04-24 PROCEDURE — 83735 ASSAY OF MAGNESIUM: CPT

## 2017-04-24 PROCEDURE — 84100 ASSAY OF PHOSPHORUS: CPT

## 2017-04-24 PROCEDURE — 700102 HCHG RX REV CODE 250 W/ 637 OVERRIDE(OP): Performed by: OPHTHALMOLOGY

## 2017-04-24 PROCEDURE — A9270 NON-COVERED ITEM OR SERVICE: HCPCS | Performed by: SURGERY

## 2017-04-24 PROCEDURE — 700112 HCHG RX REV CODE 229: Performed by: SURGERY

## 2017-04-24 PROCEDURE — A9270 NON-COVERED ITEM OR SERVICE: HCPCS | Performed by: NURSE PRACTITIONER

## 2017-04-24 PROCEDURE — 700102 HCHG RX REV CODE 250 W/ 637 OVERRIDE(OP): Performed by: SURGERY

## 2017-04-24 RX ORDER — MOXIFLOXACIN 5 MG/ML
1 SOLUTION/ DROPS OPHTHALMIC 4 TIMES DAILY
Qty: 1 BOTTLE | Refills: 0 | Status: SHIPPED | OUTPATIENT
Start: 2017-04-24

## 2017-04-24 RX ORDER — PREDNISOLONE ACETATE 10 MG/ML
1 SUSPENSION/ DROPS OPHTHALMIC 4 TIMES DAILY
Qty: 1 BOTTLE | Refills: 0 | Status: SHIPPED | OUTPATIENT
Start: 2017-04-24

## 2017-04-24 RX ORDER — GABAPENTIN 300 MG/1
300 CAPSULE ORAL 3 TIMES DAILY
Qty: 90 CAP
Start: 2017-04-24

## 2017-04-24 RX ORDER — TROPICAMIDE 5 MG/ML
1 SOLUTION/ DROPS OPHTHALMIC ONCE
Qty: 1 BOTTLE | Refills: 0
Start: 2017-04-24 | End: 2017-04-24

## 2017-04-24 RX ORDER — LISINOPRIL 20 MG/1
20 TABLET ORAL DAILY
Qty: 30 TAB
Start: 2017-04-24

## 2017-04-24 RX ORDER — AMOXICILLIN 250 MG
1 CAPSULE ORAL DAILY
Qty: 30 TAB | Refills: 0
Start: 2017-04-24

## 2017-04-24 RX ORDER — METOPROLOL SUCCINATE 50 MG/1
50 TABLET, EXTENDED RELEASE ORAL DAILY
Qty: 30 TAB
Start: 2017-04-24

## 2017-04-24 RX ORDER — POLYVINYL ALCOHOL 14 MG/ML
1 SOLUTION/ DROPS OPHTHALMIC
Qty: 1 BOTTLE | Refills: 3 | Status: SHIPPED | OUTPATIENT
Start: 2017-04-24

## 2017-04-24 RX ORDER — TRAZODONE HYDROCHLORIDE 50 MG/1
50 TABLET ORAL NIGHTLY PRN
Qty: 30 TAB | Refills: 3 | Status: SHIPPED | OUTPATIENT
Start: 2017-04-24

## 2017-04-24 RX ORDER — AMOXICILLIN 250 MG
1 CAPSULE ORAL
Qty: 30 TAB | Refills: 0 | Status: SHIPPED | OUTPATIENT
Start: 2017-04-24

## 2017-04-24 RX ORDER — OXYCODONE HYDROCHLORIDE 5 MG/1
5 TABLET ORAL
Qty: 30 TAB | Refills: 0 | Status: SHIPPED | OUTPATIENT
Start: 2017-04-24

## 2017-04-24 RX ORDER — LEVOTHYROXINE SODIUM 0.05 MG/1
50 TABLET ORAL
Qty: 30 TAB
Start: 2017-04-24

## 2017-04-24 RX ORDER — LEVOFLOXACIN 750 MG/1
750 TABLET, FILM COATED ORAL DAILY
Refills: 0
Start: 2017-04-24

## 2017-04-24 RX ADMIN — LEVOTHYROXINE SODIUM 50 MCG: 50 TABLET ORAL at 06:32

## 2017-04-24 RX ADMIN — OXYCODONE HYDROCHLORIDE 5 MG: 5 TABLET ORAL at 01:41

## 2017-04-24 RX ADMIN — MOXIFLOXACIN HYDROCHLORIDE 1 DROP: 5 SOLUTION/ DROPS OPHTHALMIC at 17:25

## 2017-04-24 RX ADMIN — GABAPENTIN 300 MG: 300 CAPSULE ORAL at 16:22

## 2017-04-24 RX ADMIN — TOBRAMYCIN AND DEXAMETHASONE: 3; 1 OINTMENT OPHTHALMIC at 07:45

## 2017-04-24 RX ADMIN — POLYVINYL ALCOHOL 1 DROP: 14 SOLUTION/ DROPS OPHTHALMIC at 16:22

## 2017-04-24 RX ADMIN — TOBRAMYCIN AND DEXAMETHASONE: 3; 1 OINTMENT OPHTHALMIC at 12:58

## 2017-04-24 RX ADMIN — MOXIFLOXACIN HYDROCHLORIDE 1 DROP: 5 SOLUTION/ DROPS OPHTHALMIC at 12:58

## 2017-04-24 RX ADMIN — LEVOFLOXACIN 750 MG: 500 TABLET, FILM COATED ORAL at 07:44

## 2017-04-24 RX ADMIN — GABAPENTIN 300 MG: 300 CAPSULE ORAL at 07:46

## 2017-04-24 RX ADMIN — PREDNISOLONE ACETATE 1 DROP: 10 SUSPENSION/ DROPS OPHTHALMIC at 12:58

## 2017-04-24 RX ADMIN — PREDNISOLONE ACETATE 1 DROP: 10 SUSPENSION/ DROPS OPHTHALMIC at 07:45

## 2017-04-24 RX ADMIN — LISINOPRIL 20 MG: 20 TABLET ORAL at 07:44

## 2017-04-24 RX ADMIN — LORAZEPAM 0.5 MG: 1 TABLET ORAL at 07:45

## 2017-04-24 RX ADMIN — POLYVINYL ALCOHOL 1 DROP: 14 SOLUTION/ DROPS OPHTHALMIC at 09:57

## 2017-04-24 RX ADMIN — PREDNISOLONE ACETATE 1 DROP: 10 SUSPENSION/ DROPS OPHTHALMIC at 17:26

## 2017-04-24 RX ADMIN — METOPROLOL SUCCINATE 50 MG: 50 TABLET, EXTENDED RELEASE ORAL at 07:44

## 2017-04-24 RX ADMIN — TOBRAMYCIN AND DEXAMETHASONE: 3; 1 OINTMENT OPHTHALMIC at 17:26

## 2017-04-24 RX ADMIN — DOCUSATE SODIUM 100 MG: 100 CAPSULE ORAL at 07:44

## 2017-04-24 RX ADMIN — POLYVINYL ALCOHOL 1 DROP: 14 SOLUTION/ DROPS OPHTHALMIC at 12:58

## 2017-04-24 RX ADMIN — LORAZEPAM 0.5 MG: 1 TABLET ORAL at 01:42

## 2017-04-24 RX ADMIN — POLYVINYL ALCOHOL 1 DROP: 14 SOLUTION/ DROPS OPHTHALMIC at 06:32

## 2017-04-24 RX ADMIN — MOXIFLOXACIN HYDROCHLORIDE 1 DROP: 5 SOLUTION/ DROPS OPHTHALMIC at 07:45

## 2017-04-24 ASSESSMENT — PAIN SCALES - GENERAL
PAINLEVEL_OUTOF10: 4
PAINLEVEL_OUTOF10: 4
PAINLEVEL_OUTOF10: 3
PAINLEVEL_OUTOF10: 4

## 2017-04-24 ASSESSMENT — ENCOUNTER SYMPTOMS
HEADACHES: 0
NAUSEA: 0
MYALGIAS: 1
SPEECH CHANGE: 0
SHORTNESS OF BREATH: 0
TREMORS: 0
EYE PAIN: 1
FEVER: 0
FOCAL WEAKNESS: 0
ABDOMINAL PAIN: 0
CHILLS: 0
VOMITING: 0

## 2017-04-24 ASSESSMENT — LIFESTYLE VARIABLES
HEADACHE, FULLNESS IN HEAD: VERY MILD
ANXIETY: *
ORIENTATION AND CLOUDING OF SENSORIUM: ORIENTED AND CAN DO SERIAL ADDITIONS
TOTAL SCORE: 6
TREMOR: TREMOR NOT VISIBLE BUT CAN BE FELT, FINGERTIP TO FINGERTIP
VISUAL DISTURBANCES: VERY MILD SENSITIVITY
AUDITORY DISTURBANCES: VERY MILD HARSHNESS OR ABILITY TO FRIGHTEN
NAUSEA AND VOMITING: NO NAUSEA AND NO VOMITING
AGITATION: NORMAL ACTIVITY
PAROXYSMAL SWEATS: NO SWEAT VISIBLE

## 2017-04-24 NOTE — PROGRESS NOTES
"Dr. Henderson notified of pt's discharge planning. Per Dr. Henderson okay to continue levaquin. He would like to see pt at 1245pm on 4/25/17. Dr. Henderson notified of pt's bloody drainage from right eye and that pt states \"it's black\" when asked if pt could see anything out of the right eye. No new orders received.   "

## 2017-04-24 NOTE — DISCHARGE PLANNING
Care Transition Team Assessment    Information Source  Orientation : Oriented x 4  Information Given By: Patient  Informant's Name: Shyla Ochoa  Who is responsible for making decisions for patient? : Patient    Readmission Evaluation  Is this a readmission?: Yes - unplanned readmission  Why do you think you were readmitted?: Continued injuries   Was an appointment arranged for you prior to discharge?: No  Were there new prescriptions you were supposed to fill after you were discharged?: No  Did you understand your discharge instructions?: Yes  Did you have enough support after your last discharge?: Yes    Elopement Risk  Legal Hold: No  Ambulatory or Self Mobile in Wheelchair: Yes  Disoriented: No  Psychiatric Symptoms: None  History of Wandering: No  Elopement this Admit: No  Vocalizing Wanting to Leave: No  Displays Behaviors, Body Language Wanting to Leave: No-Not at Risk for Elopement  Elopement Risk: Not at Risk for Elopement    Interdisciplinary Discharge Planning  Does Admitting Nurse Feel This Could be a Complex Discharge?: No  Primary Care Physician: Dr. Aleman   Lives with - Patient's Self Care Capacity: Spouse  Patient or legal guardian wants to designate a caregiver (see row info): No  Support Systems: Family Member(s)  Housing / Facility: [address]  Do You Take your Prescribed Medications Regularly: Yes  Able to Return to Previous ADL's: Future Time w/Therapy  Mobility Issues: No  Prior Services: None    Discharge Preparedness  What is your plan after discharge?: Skilled nursing facility  What are your discharge supports?: Child, Spouse  Prior Functional Level: Ambulatory, Drives Self, Independent with Activities of Daily Living, Independent with Medication Management  Difficulity with ADLs: None  Difficulity with IADLs: None    Functional Assesment  Prior Functional Level: Ambulatory, Drives Self, Independent with Activities of Daily Living, Independent with Medication  Management    Finances  Financial Barriers to Discharge: No  Prescription Coverage: Yes    Vision / Hearing Impairment  Vision Impairment : Yes  Right Eye Vision: Wears Glasses  Left Eye Vision: Impaired  Hearing Impairment : No    Values / Beliefs / Concerns  Values / Beliefs Concerns : No    Advance Directive  Advance Directive?: None    Domestic Abuse  Have you ever been the victim of abuse or violence?: No  Physical Abuse or Sexual Abuse: No  Verbal Abuse or Emotional Abuse: No  Possible Abuse Reported to:: Not Applicable    Psychological Assessment  History of Substance Abuse: Alcohol  Date Last Used - Alcohol: 04/20/2017  Substance Abuse Comments: Provided pt with substance abuse resources   History of Psychiatric Problems: No  Non-compliant with Treatment: No  Newly Diagnosed Illness: No    Discharge Risks or Barriers  Discharge risks or barriers?: Substance abuse  Patient risk factors: Substance abuse, Vulnerable adult    Anticipated Discharge Information  Anticipated discharge disposition: SNF  Discharge Address: [address] [place] [address]   Discharge Contact Phone Number: [phone]

## 2017-04-24 NOTE — DISCHARGE PLANNING
Medical Social Work    Referral: SNF choice     Intervention: MD order for SNF has been placed. Pt would like to send referral to [facility]. Faxed choice to [facility] @ 1659. Received fax confirmation.     PASRR obtained: [id number]    Plan: Awaiting SNF acceptance from [facility]. Will continue to follow.

## 2017-04-24 NOTE — DISCHARGE PLANNING
Call received from [facility], spoke to [facility] and a 1700 transport has been arranged. STEVE Johnson has been notified via voicemail.

## 2017-04-24 NOTE — DISCHARGE PLANNING
Received choice form for SNF services, referral has been sent to [facility] per patient and choice form request.

## 2017-04-24 NOTE — DISCHARGE PLANNING
Medical Social Work    Referral: Transport to [facility] today at 17:00    Intervention: Per [facility], transport has been arranged today at 17:00 via [facility] van. Met w/ pt at bedside and she is agreeable to this plan. Placed signed COBRA and packet in pt's chart. Left VM to pt's daughter Cong requesting a TC back. Per pt, she is fine with going in her robe if her daughter is unable to bring additional clothing to [facility] for transport to [facility]. Bedside RN aware of transport time/date.    Plan: Pt to discharge to [facility] today @ 17:00. Nothing further.

## 2017-04-24 NOTE — DISCHARGE PLANNING
Per the request from STEVE Johnson, call placed to [facility] to set up transport for patient. Spoke with Natalee at [facility], she will call back with transport info after contacting insurance for [employer] and patients family.

## 2017-04-24 NOTE — PROGRESS NOTES
Events noted  Opthalmology note read  No intervention for orbital floor at this time.  If eye can be salvaged, we will consider whether or not to repair orbit, if eye cannot be salvaged & needs enucleation we will let Dr. Daly address the orbital fracture.

## 2017-04-24 NOTE — PROGRESS NOTES
Report given to Kaitlyn at [facility]. Requested for [facility] to be faxed over. Faxed Med [facility]. IV removed.

## 2017-04-24 NOTE — PROGRESS NOTES
"Pt AA&Ox4. Pain rating at 4. States ativan works better. CIWA at 6 this am. Medicated with 0.5mg of Ativan. Eye drops to right eye. Pt states \"it's black\" when asked if she could see through the right eye. Eye shield in place. Sutures malia on forehead. No signs of infection noted. Pt up with standby assist and FWW. Bed alarm on. Call light within reach. Plan of care discussed. Hourly rounding in place. Blood pressure 133/84, pulse 89, temperature 36.8 °C (98.2 °F), resp. rate 16, height 1.753 m (5' 9.02\"), weight 63.3 kg (139 lb 8.8 oz), SpO2 99 %, not currently breastfeeding.    "

## 2017-04-24 NOTE — PROGRESS NOTES
"  Trauma/Surgical Progress Note    Author: Marcos Parsons Date & Time created: 4/24/2017   1:56 PM     Interval Events:    Up to chair   Tertiary exam completed   Disposition: to [facility] skilled nursing facility.  Follow up with Dr. Henderson opthalmology tomorrow 4/25 1245 pm  Pt and pt's family counseled     Review of Systems   Constitutional: Negative for fever and chills.   HENT:        Facial fracture pain   Eyes: Positive for pain.        Baseline vision left eye  Right eye pain and serosang drainage   Respiratory: Negative for shortness of breath.    Cardiovascular: Negative for chest pain.   Gastrointestinal: Negative for nausea, vomiting and abdominal pain.        4/23 BM   Genitourinary: Negative for dysuria.   Musculoskeletal: Positive for myalgias.   Skin:        Bilateral knee bruising. Per patient from previous fall.   Neurological: Negative for tremors, speech change, focal weakness and headaches.     Hemodynamics:  Blood pressure 159/69, pulse 89, temperature 36.8 °C (98.2 °F), resp. rate 17, height 1.753 m (5' 9.02\"), weight 63.3 kg (139 lb 8.8 oz), SpO2 99 %, not currently breastfeeding.     Respiratory:    Respiration: 17, Pulse Oximetry: 99 %        RUL Breath Sounds: Clear, RML Breath Sounds: Clear, RLL Breath Sounds: Diminished, STEVEN Breath Sounds: Clear, LLL Breath Sounds: Diminished  Fluids:    Intake/Output Summary (Last 24 hours) at 04/24/17 1356  Last data filed at 04/24/17 0730   Gross per 24 hour   Intake    360 ml   Output    800 ml   Net   -440 ml     Admit Weight: 70.308 kg (155 lb) (estimated)  Current      Physical Exam   Constitutional: She is oriented to person, place, and time. No distress.   HENT:   Facial sutures intact   Eyes:   Right eye patch   Bilateral periorbital ecchymosis    Neck: No JVD present.   Cardiovascular: Normal rate and regular rhythm.    Pulmonary/Chest: No respiratory distress. She exhibits no tenderness.   Abdominal: She exhibits no distension. There is no " tenderness.   Musculoskeletal: Normal range of motion.   Neurological: She is alert and oriented to person, place, and time.   Skin: Skin is warm and dry.   Bilateral knee ecchymosis    Nursing note and vitals reviewed.      Medical Decision Making/Problem List:    Active Hospital Problems    Diagnosis   • Ruptured globe of right eye [S05.31XA]     Priority: High     Rupture of right ocular globe with associated hemorrhage both intraocular and retrobulbar, with associated proptosis  4/20 - Repair of scleral alceration right eye  Richard Talbert MD. Opthalmology         • Facial fracture (CMS-HCC) [S02.92XA]     Priority: Medium     Right inferior orbital wall fracture with inferior herniation of intraorbital fat  Will likely require operative repair in a couple of weeks  Tucker Beth MD. Plastic surgery        • Trauma [T14.90]     Priority: Low     Fall against object.   CT head and cervical spine negative.      • Inadequate anticoagulation [Z51.81, Z79.01]     Priority: Low     RAP score 4  Ambulatory   Lower extremity sonogram as clinically indicated.        Core Measures & Quality Metrics:  Labs reviewed and Medications reviewed  Dumont catheter: No Dumont      DVT Prophylaxis: Not indicated at this time, ambulatory  DVT prophylaxis - mechanical: SCDs  Ulcer prophylaxis: Not indicated  Antibiotics: Treating active infection/contamination beyond 24 hours perioperative coverage  Assessed for rehab: Patient returned to prior level of function, rehabilitation not indicated at this time    Total Score: 4    Discussed patient condition with RN, Patient and trauma surgery Dr. Zay Calderon .      Seen on rounds  Clinically stable  Awaiting placement/ safe discharge  Discussed with Marcos Calderon MD

## 2017-04-24 NOTE — DISCHARGE SUMMARY
DATE OF ADMISSION:  4/20/2017    DATE OF TRANSFER:  4/24/2017    ATTENDING PHYSICIAN:  Zay Calderon MD    CONSULTING PHYSICIANS:  1.  Tucker Beth MD, plastic surgery.  2.  Richard Talbert MD, ophthalmology.    DISCHARGE DIAGNOSES:  1.  Trauma sustained from a fall against an object.  2.  Ruptured globe of right eye.  3.  Facial fractures.    PROCEDURES:  Date of procedure:  4/20/2017, procedure performed by Dr. Richard Talbert, repair of scleral laceration, right eye.    HISTORY OF PRESENT ILLNESS:  The patient is a 76-year-old female who   reportedly fell and struck her face against a hard object on the day of admission.    Review of the records indicate that she did not lose consciousness and experienced  right eye pain and facial laceration post fall.  She presented to [facility] in [place], [place], where she was triaged as a trauma in accordance   with pre-established hospital protocols.    HOSPITAL COURSE:  On arrival, the patient underwent extensive radiographic and   laboratory studies, and she was admitted to the critical care team under the   direction and supervision of Dr. Zay Calderon.  She sustained the above   injuries and incurred the above diagnoses during her stay.    She transferred from the emergency department to the general surgical floor   Where a tertiary exam was performed.  Dr. Richard Talbert, ophthalmology, was   consulted for a rupture of the right ocular globe with associated hemorrhage   both intraocular and retrobulbar with associated proptosis.  She proceeded to   the operating theater on 4/20/2017. For a detailed review of the operative procedure   Please reference the procedures tab as dictated above.  Currently, she has a   Eye patch in place with small amounts serosanguineous drainage. Oral antibiotic   Therapy and optic drops are in place and opthalmology would like to continue   These at this time. A follow up appointment has been arranged with   [facility]   tomorrow 4/25/2017 at 1245.    Dr. Tucker Beth, plastic surgery, was consulted for a right inferior orbital   wall fracture.  This was managed nonoperatively and there was no acute   Injury of the head and cervical spine on CT imaging.  Sutures to patients   Facial laceration are clean dry and in place.  There are no overt signs of   infection.    There was concern for alcohol withdrawal and  CIWA protocol was initiated.  At this time, the patient demonstrates no overt signs and symptoms   of acute alcohol withdrawal and she is a Cardale coma score of 15,     The patient at this time is out of bed to the chair, ambulatory with assistive device,   on room air,  tolerating an oral diet and her bowel and bladder function have returned to   normal.  There was a history of multiple outpatient falls and alcohol abuse.  Given her past medical history and current injuries she was a candidate for post acute   Services. She was accepted to [facility]  on 4/24/2017.    DISCHARGE PHYSICAL EXAM:  Please see exam dated 4/24/2017.    DISCHARGE MEDICATIONS:  1.  Gabapentin 300 mg tablet, take 300 mg by mouth 3 times a day.  2.  Probiotic product (probiotic) p.o., take 1 tab by mouth every day.  3.  Lisinopril 20 mg tablet, take 20 mg by mouth daily.  4.  Metoprolol SR 50 mg tablet SR 24-hour, take 50 mg by mouth every day.  5.  Levaquin tablet 750 mg, take 750 mg daily orally until cleared by   ophthalmology.  6.  Artificial tears 1.4% ophthalmologic solution 1 drop 6 times daily, right   eye.  7.  Moxifloxacin (Vigamox 0.5%) ophthalmic solution 1 drop, take 1 drop 4   times daily to right eye.  8.  Prednisolone acetate (Pred Forte) 1% ophthalmic suspension 1 drop, take 1   drop 4 times daily, right eye.  9.  Tobramycin-dexamethasone (TobraDex ophthalmic ointment) apply 4 times   daily, right eye, 1/4 inch ribbon to surface.  10.  Synthroid 50 mg tablet, take 50 mg each morning on empty  stomach.  11.  Prilosec 20 mg delayed-release capsule, take 20 mg by mouth every day.    DISPOSITION:  The patient will be discharged to [facility] in   good condition on 4/24/2017.  She will follow up with Dr. Rasta Henderson,   ophthalmology, as scheduled tomorrow at 1245..  If the patient is   discharged from skilled nursing facility, she will need a p.o. reaction for   moxifloxacin in conjunction with her eyedrops.  The patient and her family have been   extensively counseled.  All of her questions have been answered.  Special   attention was paid to the signs and symptoms of infection.    TIME SPENT ON DISCHARGE:  40 minutes.       ____________________________________     KENDRICK CLEMENT / NTS    DD:  04/24/2017 13:34:59  DT:  04/24/2017 14:25:02    D#:  [id number]  Job#:  [id number]    cc: TEAGAN NELSON MD, Rasta Henderson MD, Primary Care Provider ,   WAYNE WILSON MD

## 2017-04-24 NOTE — DISCHARGE PLANNING
Medical Social Work    Referral: [facility] transport today     Intervention: Per [facility], pt has been accepted to [facility]. Pt is medically cleared per MD. Faxed transport communication form to [facility] @ [phone] requesting transport for 17:00. Received fax confirmation.     Met w/ pt at bedside and she is agreeable to above plan. She stated she has no clothing to discharge home in. Called pt's daughter Cong requesting she bring in clothing before pt discharges to SNF.     Plan: Awaiting confirmed transport time to [facility] today. Will update pt, bedside RN, and family once time is established.

## 2017-04-24 NOTE — DISCHARGE PLANNING
Medical Social Work    This  received report from Unit STEVE Johnson stating that d/c summary has been addended and the new one needs to be sent to [facility].  This  placed a call to JAZMYN Ponce who stated that she will do that now.

## 2017-04-24 NOTE — PROGRESS NOTES
Bedside report received.  Assessment complete.  A&O x 4. Patient calls appropriately.  Denies numbness and tingling. Moves extremities.   Patient up with 1 assist and FWW. Bed alarm in place.   Patient has 3/10 pain. MAR meds given  Denies N&V. Tolerating  diet.  + void, + flatus  Right eye shield in place. Sutures Open to air above right eye. CIWA at 5  Review plan with of care with patient. Call light and personal belongings with in reach. Hourly rounding in place. All needs met at this time.

## 2021-01-13 DIAGNOSIS — Z23 NEED FOR VACCINATION: ICD-10-CM

## (undated) DEVICE — KIT ROOM DECONTAMINATION

## (undated) DEVICE — Device

## (undated) DEVICE — GLOVE BIOGEL SZ 8 SURGICAL PF LTX - (50PR/BX 4BX/CA)

## (undated) DEVICE — CANISTER SUCTION 3000ML MECHANICAL FILTER AUTO SHUTOFF MEDI-VAC NONSTERILE LF DISP  (40EA/CA)

## (undated) DEVICE — DRESSING TRANSPARENT FILM TEGADERM 4 X 4.75" (50EA/BX)"

## (undated) DEVICE — SHIELD OPTH AL GRTR CVR FOX (50EA/BX)

## (undated) DEVICE — PROTECTOR ULNA NERVE - (36PR/CA)

## (undated) DEVICE — SYRINGE SAFETY 3 ML 18 GA X 1 1/2 BLUNT LL (100/BX 8BX/CA)

## (undated) DEVICE — SYRINGE SAFETY TB 1 ML 27 GA X 1/2 IN (100/BX 4BX/CA)

## (undated) DEVICE — SENSOR SPO2 NEO LNCS ADHESIVE (20/BX) SEE USER NOTES

## (undated) DEVICE — GLOVE BIOGEL SZ 7 SURGICAL PF LTX - (50PR/BX 4BX/CA)

## (undated) DEVICE — LEAD SET 6 DISP. EKG NIHON KOHDEN

## (undated) DEVICE — MASK, LARYNGEAL AIRWAY #4

## (undated) DEVICE — GLOVE BIOGEL SZ 7.5 SURGICAL PF LTX - (50PR/BX 4BX/CA)

## (undated) DEVICE — SUTURE 9-0 ETHILON BV100-4 5 (12PK/BX)"

## (undated) DEVICE — SUTURE EYE

## (undated) DEVICE — PAD EYE GAUZE COVERED OVAL 1 5/8 X 2 5/8" STERILE"

## (undated) DEVICE — SET LEADWIRE 5 LEAD BEDSIDE DISPOSABLE ECG (1SET OF 5/EA)

## (undated) DEVICE — HEAD HOLDER JUNIOR/ADULT

## (undated) DEVICE — NEPTUNE 4 PORT MANIFOLD - (20/PK)

## (undated) DEVICE — DETERGENT RENUZYME PLUS 10 OZ PACKET (50/BX)

## (undated) DEVICE — BOVIE BLADE COATED - (50/PK)

## (undated) DEVICE — SUTURE NABSB 4-0 P-5 18IN ACS BLK (12PK/BX)

## (undated) DEVICE — SUCTION INSTRUMENT YANKAUER BULBOUS TIP W/O VENT (50EA/CA)

## (undated) DEVICE — MASK ANESTHESIA ADULT  - (100/CA)

## (undated) DEVICE — TUBING CLEARLINK DUO-VENT - C-FLO (48EA/CA)

## (undated) DEVICE — SPEAR EYE SPNG 3ANG MLBL HNDL - (10/ST18ST/PK 180/PK 1PK/SP)

## (undated) DEVICE — ELECTRODE 850 FOAM ADHESIVE - HYDROGEL RADIOTRNSPRNT (50/PK)

## (undated) DEVICE — LACTATED RINGERS INJ 1000 ML - (14EA/CA 60CA/PF)

## (undated) DEVICE — GOWN SURGICAL X-LARGE ULTRA - FILM-REINFORCED (20/CA)

## (undated) DEVICE — KIT ANESTHESIA W/CIRCUIT & 3/LT BAG W/FILTER (20EA/CA)

## (undated) DEVICE — SET EXTENSION WITH 2 PORTS (48EA/CA) ***PART #2C8610 IS A SUBSTITUTE*****

## (undated) DEVICE — GLOVE BIOGEL SZ 8.5 SURGICAL PF LTX - (50PR/BX 4BX/CA)

## (undated) DEVICE — GOWN WARMING STANDARD FLEX - (30/CA)

## (undated) DEVICE — SYRINGE SAFETY 10 ML 18 GA X 1 1/2 BLUNT LL (100/BX 4BX/CA)

## (undated) DEVICE — DRAPE STRLE REG TOWEL 18X24 - (10/BX 4BX/CA)"

## (undated) DEVICE — GLOVE BIOGEL INDICATOR SZ 7.5 SURGICAL PF LTX - (50PR/BX 4BX/CA)